# Patient Record
Sex: MALE | Race: OTHER | NOT HISPANIC OR LATINO | URBAN - METROPOLITAN AREA
[De-identification: names, ages, dates, MRNs, and addresses within clinical notes are randomized per-mention and may not be internally consistent; named-entity substitution may affect disease eponyms.]

---

## 2019-10-29 PROBLEM — Z00.00 ENCOUNTER FOR PREVENTIVE HEALTH EXAMINATION: Status: ACTIVE | Noted: 2019-10-29

## 2022-12-17 ENCOUNTER — INPATIENT (INPATIENT)
Facility: HOSPITAL | Age: 52
LOS: 9 days | Discharge: HOME HEALTH | End: 2022-12-27
Attending: STUDENT IN AN ORGANIZED HEALTH CARE EDUCATION/TRAINING PROGRAM | Admitting: GENERAL ACUTE CARE HOSPITAL

## 2022-12-17 VITALS
HEART RATE: 118 BPM | HEIGHT: 68 IN | DIASTOLIC BLOOD PRESSURE: 81 MMHG | SYSTOLIC BLOOD PRESSURE: 117 MMHG | RESPIRATION RATE: 24 BRPM | TEMPERATURE: 99 F | WEIGHT: 250 LBS | OXYGEN SATURATION: 50 %

## 2022-12-17 DIAGNOSIS — U07.1 COVID-19: ICD-10-CM

## 2022-12-17 DIAGNOSIS — E66.2 MORBID (SEVERE) OBESITY WITH ALVEOLAR HYPOVENTILATION: ICD-10-CM

## 2022-12-17 DIAGNOSIS — A41.9 SEPSIS, UNSPECIFIED ORGANISM: ICD-10-CM

## 2022-12-17 LAB
ALBUMIN SERPL ELPH-MCNC: 2.7 G/DL — LOW (ref 3.3–5)
ALP SERPL-CCNC: 73 U/L — SIGNIFICANT CHANGE UP (ref 40–120)
ALT FLD-CCNC: 26 U/L — SIGNIFICANT CHANGE UP (ref 12–78)
ANION GAP SERPL CALC-SCNC: 3 MMOL/L — LOW (ref 5–17)
AST SERPL-CCNC: 11 U/L — LOW (ref 15–37)
BASOPHILS # BLD AUTO: 0.03 K/UL — SIGNIFICANT CHANGE UP (ref 0–0.2)
BASOPHILS NFR BLD AUTO: 0.3 % — SIGNIFICANT CHANGE UP (ref 0–2)
BILIRUB SERPL-MCNC: 0.3 MG/DL — SIGNIFICANT CHANGE UP (ref 0.2–1.2)
BUN SERPL-MCNC: 19 MG/DL — SIGNIFICANT CHANGE UP (ref 7–23)
CALCIUM SERPL-MCNC: 8.2 MG/DL — LOW (ref 8.5–10.1)
CHLORIDE SERPL-SCNC: 99 MMOL/L — SIGNIFICANT CHANGE UP (ref 96–108)
CO2 SERPL-SCNC: 38 MMOL/L — HIGH (ref 22–31)
CREAT SERPL-MCNC: 0.82 MG/DL — SIGNIFICANT CHANGE UP (ref 0.5–1.3)
D DIMER BLD IA.RAPID-MCNC: 287 NG/ML DDU — HIGH
EGFR: 106 ML/MIN/1.73M2 — SIGNIFICANT CHANGE UP
EOSINOPHIL # BLD AUTO: 0.22 K/UL — SIGNIFICANT CHANGE UP (ref 0–0.5)
EOSINOPHIL NFR BLD AUTO: 2 % — SIGNIFICANT CHANGE UP (ref 0–6)
FLUAV AG NPH QL: SIGNIFICANT CHANGE UP
FLUBV AG NPH QL: SIGNIFICANT CHANGE UP
GLUCOSE BLDC GLUCOMTR-MCNC: 170 MG/DL — HIGH (ref 70–99)
GLUCOSE SERPL-MCNC: 142 MG/DL — HIGH (ref 70–99)
HCT VFR BLD CALC: 42.2 % — SIGNIFICANT CHANGE UP (ref 39–50)
HGB BLD-MCNC: 12.9 G/DL — LOW (ref 13–17)
IMM GRANULOCYTES NFR BLD AUTO: 0.6 % — SIGNIFICANT CHANGE UP (ref 0–0.9)
LACTATE SERPL-SCNC: 0.9 MMOL/L — SIGNIFICANT CHANGE UP (ref 0.7–2)
LYMPHOCYTES # BLD AUTO: 1.86 K/UL — SIGNIFICANT CHANGE UP (ref 1–3.3)
LYMPHOCYTES # BLD AUTO: 16.8 % — SIGNIFICANT CHANGE UP (ref 13–44)
MCHC RBC-ENTMCNC: 27.3 PG — SIGNIFICANT CHANGE UP (ref 27–34)
MCHC RBC-ENTMCNC: 30.6 G/DL — LOW (ref 32–36)
MCV RBC AUTO: 89.4 FL — SIGNIFICANT CHANGE UP (ref 80–100)
MONOCYTES # BLD AUTO: 1.21 K/UL — HIGH (ref 0–0.9)
MONOCYTES NFR BLD AUTO: 11 % — SIGNIFICANT CHANGE UP (ref 2–14)
NEUTROPHILS # BLD AUTO: 7.65 K/UL — HIGH (ref 1.8–7.4)
NEUTROPHILS NFR BLD AUTO: 69.3 % — SIGNIFICANT CHANGE UP (ref 43–77)
NRBC # BLD: 0 /100 WBCS — SIGNIFICANT CHANGE UP (ref 0–0)
NT-PROBNP SERPL-SCNC: 443 PG/ML — HIGH (ref 0–125)
PLATELET # BLD AUTO: 185 K/UL — SIGNIFICANT CHANGE UP (ref 150–400)
POTASSIUM SERPL-MCNC: 4.5 MMOL/L — SIGNIFICANT CHANGE UP (ref 3.5–5.3)
POTASSIUM SERPL-SCNC: 4.5 MMOL/L — SIGNIFICANT CHANGE UP (ref 3.5–5.3)
PROT SERPL-MCNC: 7.9 GM/DL — SIGNIFICANT CHANGE UP (ref 6–8.3)
RBC # BLD: 4.72 M/UL — SIGNIFICANT CHANGE UP (ref 4.2–5.8)
RBC # FLD: 13.3 % — SIGNIFICANT CHANGE UP (ref 10.3–14.5)
SARS-COV-2 RNA SPEC QL NAA+PROBE: DETECTED
SODIUM SERPL-SCNC: 140 MMOL/L — SIGNIFICANT CHANGE UP (ref 135–145)
TROPONIN I, HIGH SENSITIVITY RESULT: 54.3 NG/L — SIGNIFICANT CHANGE UP
WBC # BLD: 11.04 K/UL — HIGH (ref 3.8–10.5)
WBC # FLD AUTO: 11.04 K/UL — HIGH (ref 3.8–10.5)

## 2022-12-17 PROCEDURE — 99284 EMERGENCY DEPT VISIT MOD MDM: CPT

## 2022-12-17 PROCEDURE — 93010 ELECTROCARDIOGRAM REPORT: CPT

## 2022-12-17 PROCEDURE — 99233 SBSQ HOSP IP/OBS HIGH 50: CPT

## 2022-12-17 PROCEDURE — 71045 X-RAY EXAM CHEST 1 VIEW: CPT | Mod: 26

## 2022-12-17 RX ORDER — SODIUM CHLORIDE 9 MG/ML
1000 INJECTION, SOLUTION INTRAVENOUS
Refills: 0 | Status: DISCONTINUED | OUTPATIENT
Start: 2022-12-17 | End: 2022-12-27

## 2022-12-17 RX ORDER — DEXTROSE 50 % IN WATER 50 %
25 SYRINGE (ML) INTRAVENOUS ONCE
Refills: 0 | Status: DISCONTINUED | OUTPATIENT
Start: 2022-12-17 | End: 2022-12-27

## 2022-12-17 RX ORDER — DEXAMETHASONE 0.5 MG/5ML
6 ELIXIR ORAL DAILY
Refills: 0 | Status: COMPLETED | OUTPATIENT
Start: 2022-12-18 | End: 2022-12-26

## 2022-12-17 RX ORDER — ENOXAPARIN SODIUM 100 MG/ML
40 INJECTION SUBCUTANEOUS EVERY 12 HOURS
Refills: 0 | Status: DISCONTINUED | OUTPATIENT
Start: 2022-12-17 | End: 2022-12-27

## 2022-12-17 RX ORDER — DEXTROSE 50 % IN WATER 50 %
12.5 SYRINGE (ML) INTRAVENOUS ONCE
Refills: 0 | Status: DISCONTINUED | OUTPATIENT
Start: 2022-12-17 | End: 2022-12-27

## 2022-12-17 RX ORDER — GLUCAGON INJECTION, SOLUTION 0.5 MG/.1ML
1 INJECTION, SOLUTION SUBCUTANEOUS ONCE
Refills: 0 | Status: DISCONTINUED | OUTPATIENT
Start: 2022-12-17 | End: 2022-12-27

## 2022-12-17 RX ORDER — DEXTROSE 50 % IN WATER 50 %
15 SYRINGE (ML) INTRAVENOUS ONCE
Refills: 0 | Status: DISCONTINUED | OUTPATIENT
Start: 2022-12-17 | End: 2022-12-27

## 2022-12-17 RX ORDER — ACETAMINOPHEN 500 MG
650 TABLET ORAL EVERY 6 HOURS
Refills: 0 | Status: DISCONTINUED | OUTPATIENT
Start: 2022-12-17 | End: 2022-12-27

## 2022-12-17 RX ORDER — ALBUTEROL 90 UG/1
2 AEROSOL, METERED ORAL EVERY 6 HOURS
Refills: 0 | Status: DISCONTINUED | OUTPATIENT
Start: 2022-12-17 | End: 2022-12-27

## 2022-12-17 RX ORDER — METFORMIN HYDROCHLORIDE 850 MG/1
1 TABLET ORAL
Qty: 0 | Refills: 0 | DISCHARGE

## 2022-12-17 RX ORDER — LANOLIN ALCOHOL/MO/W.PET/CERES
3 CREAM (GRAM) TOPICAL AT BEDTIME
Refills: 0 | Status: DISCONTINUED | OUTPATIENT
Start: 2022-12-17 | End: 2022-12-27

## 2022-12-17 RX ORDER — INSULIN LISPRO 100/ML
VIAL (ML) SUBCUTANEOUS
Refills: 0 | Status: DISCONTINUED | OUTPATIENT
Start: 2022-12-17 | End: 2022-12-27

## 2022-12-17 RX ORDER — DEXAMETHASONE 0.5 MG/5ML
6 ELIXIR ORAL ONCE
Refills: 0 | Status: COMPLETED | OUTPATIENT
Start: 2022-12-17 | End: 2022-12-17

## 2022-12-17 RX ADMIN — Medication 6 MILLIGRAM(S): at 18:21

## 2022-12-17 RX ADMIN — Medication 1200 MILLIGRAM(S): at 18:04

## 2022-12-17 RX ADMIN — ENOXAPARIN SODIUM 40 MILLIGRAM(S): 100 INJECTION SUBCUTANEOUS at 23:47

## 2022-12-17 NOTE — ED PROVIDER NOTE - CLINICAL SUMMARY MEDICAL DECISION MAKING FREE TEXT BOX
Patient with Covid infection and noted hypoxia at home. Will need inpatient stay due to O2 requirement. Will give Decadron in ED and admit for further care.      Pt labs were ordered and meds as well - pt then got up and spoke to RN - stated he would go to bathroom - thereafter pt could not be located. RN manager and security alerted in case pt still on grounds. Pt was A&Ox3 at time of evaluation but given hypoxia - there is concern for his well being. calls made to patient and wife without any return calls. Pt likely eloped, unsure of reason why as pt encounter had been going smoothly up until time of pt elopement. Patient with Covid infection and noted hypoxia at home. Will need inpatient stay due to O2 requirement. Will give Decadron in ED and admit for further care.

## 2022-12-17 NOTE — ED PROVIDER NOTE - RESPIRATORY, MLM
Breath sounds clear and equal bilaterally.Some slightly diminished breath sounds at bases. No noted edema in the extremities. Excoriations of the bilateral lower extremities likely secondary to dry skin vs eczema.

## 2022-12-17 NOTE — H&P ADULT - NSHPLABSRESULTS_GEN_ALL_CORE
12.9   11.04 )-----------( 185      ( 17 Dec 2022 17:30 )             42.2       12-17    140  |  99  |  19  ----------------------------<  142<H>  4.5   |  38<H>  |  0.82    Ca    8.2<L>      17 Dec 2022 17:30    TPro  7.9  /  Alb  2.7<L>  /  TBili  0.3  /  DBili  x   /  AST  11<L>  /  ALT  26  /  AlkPhos  73  12-17          ABG - ( 18 Dec 2022 05:07 )  pH, Arterial: 7.31  pH, Blood: x     /  pCO2: 81    /  pO2: 129   / HCO3: 41    / Base Excess: 11.0  /  SaO2: 98.7

## 2022-12-17 NOTE — H&P ADULT - HISTORY OF PRESENT ILLNESS
Patient is a native farsi and Japanese speaker. I am fluent in Farsi and examined patient in farsi. Father updated as well. Pt himself somewhat confused, but oriented fully.     51 y/o M with a PMHx of prediabetes presents due to SOB, cough and subjective fever at home. Patient is noted to be Covid + since yesterday. Per ED, patient noted to have O2 saturation of 58% on room air at home. Patient saturations improved on NRB, but he is non-compliant and repeatedly takes off mask out of confusion and agitation despite high respiratory rate. Repeatedly explained importance of oxygen administration for patient's prognosis. Denies any current chest pain, abdominal pain, myalgias, diarrhea, headaches, anosmia.

## 2022-12-17 NOTE — ED PROVIDER NOTE - OBJECTIVE STATEMENT
53 y/o M with a PMHx of DM presents to the ED due to SOB at home. Patient states that he was recently sick with cough and URI. Patient is noted to be Covid + since yesterday. Otherwise, patient noted to have O2 saturation of 58% on room air at home and 93% on nasal cannula at the hospital. Denies any current chest pain, fever, or chills. Patient was seen earlier and eloped form the ED. Patient has pending labs and medication. Patient states that he eloped because he felt like he did not receive proper care here at the ED. Patient reports that his family picked him up and then later returned him back to the ED today.

## 2022-12-17 NOTE — H&P ADULT - ASSESSMENT
#Acute Hypoxic Respiratory Failure #[place] 2/2 COVID-19   - Continuous pulse ox, monitor  - Titrate O2>92%  - Decadron 6mg qDx10 days  - Albuterol, tylenol, tessalon PRN  - F/u inflammatory markers to assess patient prognosis    #Obesity Hypoventilation Syndrome  - Likely contributory to reduced respiratory reserve  - BMI of 38, Bicarb of 38  - Evaluate pt with sleep study for sleep apnea outpatient    #DM  - Anticipate elevated blood glucose with steroid use  - Mod SSI  - Hold home metformin  - F/u POCT, A1c    Code: Full  VTE: Lovenox  Diet: Regular   #Acute Hypoxic Respiratory Failure #Pneumonia 2/2 COVID-19   - Continuous pulse ox, monitor  - Titrate O2>92%  - Decadron 6mg qD x 10 days  - Albuterol, tylenol, tessalon PRN  - F/u inflammatory markers to assess patient prognosis    #Obesity Hypoventilation Syndrome  - Likely contributory to reduced respiratory reserve  - BMI of 38, Bicarb of 38  - Evaluate pt with sleep study for sleep apnea outpatient    #Prediabetes  - Anticipate elevated blood glucose with steroid use  - Mod SSI  - Hold home metformin  - F/u POCT, A1c    Code: Full  VTE: Lovenox  Diet: Regular   #Acute Hypoxic and Hypercapneic Respiratory Failure #Pneumonia 2/2 COVID-19 #Respiratory Acidosis with Metabolic Alkalosis #Obesity Hypoventilation Syndrome  - AB.19, CO2: 114, HCO3: 38  - Bipap initiated, 14/8 @50%  - Continuous pulse ox, monitor  - Titrate O2>92%  - Decadron 6mg qD x 10 days  - Albuterol, tylenol, tessalon PRN  - OHS likely contributory to reduced respiratory reserve  - Patient doesn't smoke, no hx of COPD or asthma  - BMI of 38, Bicarb of 38  - Evaluate pt with sleep study for sleep apnea outpatient  - F/u inflammatory markers to assess patient prognosis    #Prediabetes  - Anticipate elevated blood glucose with steroid use  - Mod SSI  - Hold home metformin  - F/u POCT, A1c    Code: Full  VTE: Lovenox  Diet: Regular   #Acute Hypoxic and Hypercapneic Respiratory Failure #Pneumonia 2/2 COVID-19 #Respiratory Acidosis with Metabolic Alkalosis #Obesity Hypoventilation Syndrome  - AB.19, CO2: 114, HCO3: 38  - Bipap initiated, 16/8 @50%  - F/u repeat blood gas  - Continuous pulse ox, monitor  - Titrate O2>92%  - Decadron 6mg qD x 10 days  - Albuterol, tylenol, tessalon PRN  - OHS likely contributory to reduced respiratory reserve  - Patient doesn't smoke, no hx of COPD or asthma  - BMI of 38, Bicarb of 38  - Evaluate pt with sleep study for sleep apnea outpatient  - F/u inflammatory markers to assess patient prognosis    #Prediabetes  - Anticipate elevated blood glucose with steroid use  - Mod SSI  - Hold home metformin  - F/u POCT, A1c    Code: Full  VTE: Lovenox  Diet: Regular   #Acute Hypoxic and Hypercapneic Respiratory Failure #Pneumonia 2/2 COVID-19 #Respiratory Acidosis with Metabolic Alkalosis #Obesity Hypoventilation Syndrome  - Initial AB.19, CO2: 114, HCO3: 38  - Bipap initiated, 16/8 @50%  - F/u repeat blood gas  - Continuous pulse ox, monitor  - Titrate O2>92%  - Decadron 6mg qD x 10 days  - Albuterol, tylenol, tessalon PRN  - OHS likely contributory to reduced respiratory reserve  - Patient doesn't smoke, no hx of COPD or asthma  - BMI of 38, Bicarb of 38  - Evaluate pt with sleep study for sleep apnea outpatient  - F/u inflammatory markers to assess patient prognosis    #Prediabetes  - Anticipate elevated blood glucose with steroid use  - Mod SSI  - Hold home metformin  - F/u POCT, A1c    Code: Full  VTE: Lovenox  Diet: Regular   #Acute Hypoxic and Hypercapneic Respiratory Failure   #Pneumonia 2/2 COVID-19   #Respiratory Acidosis with Metabolic Alkalosis   #Obesity Hypoventilation Syndrome    - Initial AB.19, CO2: 114, HCO3: 38  - Bipap initiated, 16/8 @50%  - F/u repeat blood gas  - Continuous pulse ox, monitor  - Titrate O2>92%  - Decadron 6mg qD x 10 days  - Albuterol, tylenol, tessalon PRN  - OHS likely contributory to reduced respiratory reserve  - Patient doesn't smoke, no hx of COPD or asthma  - BMI of 38, Bicarb of 38  - Evaluate pt with sleep study for sleep apnea outpatient  - F/u inflammatory markers to assess patient prognosis    #Prediabetes  - Anticipate elevated blood glucose with steroid use  - Mod SSI  - Hold home metformin  - F/u POCT, A1c    Code: Full  VTE: Lovenox  Diet: Regular

## 2022-12-17 NOTE — ED ADULT NURSE NOTE - OBJECTIVE STATEMENT
Pt presents to ed c/o sob, cough, congestion. Pt states +covid since yesterday. +sick contacts at home Pt came to [facility] ed earlier today but eloped to go eat lunch. Now pt back in ed for the 2nd time today. Per ems, spo2 58% on room air at home. Spo2 96% on NRB in ed. Pt appears unwell, diaphoretic. Pt denies cp, dizziness, n/v/d/c, fever/chills, numbness/tingling, abdominal pain, weakness, back pain, neck pain, headache, rash. Sinus tachycardia noted on cardiac montior. 12 lead ekg in progress. +labored breathing.

## 2022-12-17 NOTE — H&P ADULT - NSHPPHYSICALEXAM_GEN_ALL_CORE
T(C): 36.7 (12-18-22 @ 00:00), Max: 37.4 (12-17-22 @ 13:33)  HR: 123 (12-18-22 @ 05:16) (105 - 126)  BP: 128/84 (12-18-22 @ 04:00) (117/81 - 147/98)  RR: 23 (12-18-22 @ 04:00) (20 - 25)  SpO2: 94% (12-18-22 @ 05:16) (50% - 99%)    CONSTITUTIONAL: Well groomed, mild respiratory distress on NRB, diaphoretic  EYES: PERRLA and symmetric, EOMI, No conjunctival or scleral injection, non-icteric  ENMT: Oral mucosa with moist membranes. Normal dentition; no pharyngeal injection or exudates  NECK: Supple, symmetric and without tracheal deviation   RESP: No respiratory distress, no use of accessory muscles; CTA b/l, no WRR  CV: RRR, +S1S2, no MRG; no JVD; no peripheral edema  GI: Soft, NT, ND, no rebound, no guarding  LYMPH: No cervical LAD or tenderness; no axillary LAD or tenderness; no inguinal LAD or tenderness  MSK: Normal ROM without pain, no spinal tenderness, normal muscle strength/tone  SKIN: No rashes or ulcers noted; no subcutaneous nodules or induration palpable  NEURO: CN II-XII intact; sensation intact in upper and lower extremities b/l to light touch   PSYCH: Mood and affect appropriate, recent/remote memory intact

## 2022-12-18 DIAGNOSIS — J96.02 ACUTE RESPIRATORY FAILURE WITH HYPERCAPNIA: ICD-10-CM

## 2022-12-18 DIAGNOSIS — J96.01 ACUTE RESPIRATORY FAILURE WITH HYPOXIA: ICD-10-CM

## 2022-12-18 DIAGNOSIS — E87.4 MIXED DISORDER OF ACID-BASE BALANCE: ICD-10-CM

## 2022-12-18 DIAGNOSIS — J96.22 ACUTE AND CHRONIC RESPIRATORY FAILURE WITH HYPERCAPNIA: ICD-10-CM

## 2022-12-18 LAB
A1C WITH ESTIMATED AVERAGE GLUCOSE RESULT: 7 % — HIGH (ref 4–5.6)
ALBUMIN SERPL ELPH-MCNC: 2.5 G/DL — LOW (ref 3.3–5)
ALP SERPL-CCNC: 74 U/L — SIGNIFICANT CHANGE UP (ref 40–120)
ALT FLD-CCNC: 23 U/L — SIGNIFICANT CHANGE UP (ref 12–78)
ANION GAP SERPL CALC-SCNC: -1 MMOL/L — LOW (ref 5–17)
AST SERPL-CCNC: 11 U/L — LOW (ref 15–37)
BASE EXCESS BLDA CALC-SCNC: 10.5 MMOL/L — HIGH (ref -2–3)
BASE EXCESS BLDA CALC-SCNC: 11 MMOL/L — HIGH (ref -2–3)
BILIRUB SERPL-MCNC: 0.2 MG/DL — SIGNIFICANT CHANGE UP (ref 0.2–1.2)
BLOOD GAS COMMENTS ARTERIAL: SIGNIFICANT CHANGE UP
BLOOD GAS COMMENTS ARTERIAL: SIGNIFICANT CHANGE UP
BUN SERPL-MCNC: 19 MG/DL — SIGNIFICANT CHANGE UP (ref 7–23)
CALCIUM SERPL-MCNC: 8.4 MG/DL — LOW (ref 8.5–10.1)
CHLORIDE SERPL-SCNC: 101 MMOL/L — SIGNIFICANT CHANGE UP (ref 96–108)
CHOLEST SERPL-MCNC: 166 MG/DL — SIGNIFICANT CHANGE UP
CO2 BLDA-SCNC: 43 MMOL/L — HIGH (ref 19–24)
CO2 BLDA-SCNC: 47 MMOL/L — HIGH (ref 19–24)
CO2 SERPL-SCNC: 39 MMOL/L — HIGH (ref 22–31)
CREAT SERPL-MCNC: 0.78 MG/DL — SIGNIFICANT CHANGE UP (ref 0.5–1.3)
CRP SERPL-MCNC: 179 MG/L — HIGH
CRP SERPL-MCNC: 190 MG/L — HIGH
EGFR: 107 ML/MIN/1.73M2 — SIGNIFICANT CHANGE UP
ERYTHROCYTE [SEDIMENTATION RATE] IN BLOOD: 67 MM/HR — HIGH (ref 0–20)
ESTIMATED AVERAGE GLUCOSE: 154 MG/DL — HIGH (ref 68–114)
FERRITIN SERPL-MCNC: 86 NG/ML — SIGNIFICANT CHANGE UP (ref 30–400)
FERRITIN SERPL-MCNC: 92 NG/ML — SIGNIFICANT CHANGE UP (ref 30–400)
GAS PNL BLDA: SIGNIFICANT CHANGE UP
GAS PNL BLDA: SIGNIFICANT CHANGE UP
GLUCOSE BLDC GLUCOMTR-MCNC: 102 MG/DL — HIGH (ref 70–99)
GLUCOSE BLDC GLUCOMTR-MCNC: 120 MG/DL — HIGH (ref 70–99)
GLUCOSE BLDC GLUCOMTR-MCNC: 141 MG/DL — HIGH (ref 70–99)
GLUCOSE SERPL-MCNC: 163 MG/DL — HIGH (ref 70–99)
HCO3 BLDA-SCNC: 41 MMOL/L — HIGH (ref 21–28)
HCO3 BLDA-SCNC: 44 MMOL/L — HIGH (ref 21–28)
HCT VFR BLD CALC: 44.1 % — SIGNIFICANT CHANGE UP (ref 39–50)
HDLC SERPL-MCNC: 42 MG/DL — SIGNIFICANT CHANGE UP
HGB BLD-MCNC: 12.9 G/DL — LOW (ref 13–17)
HOROWITZ INDEX BLDA+IHG-RTO: 100 — SIGNIFICANT CHANGE UP
HOROWITZ INDEX BLDA+IHG-RTO: 60 — SIGNIFICANT CHANGE UP
LIPID PNL WITH DIRECT LDL SERPL: 103 MG/DL — HIGH
MCHC RBC-ENTMCNC: 26.7 PG — LOW (ref 27–34)
MCHC RBC-ENTMCNC: 29.3 G/DL — LOW (ref 32–36)
MCV RBC AUTO: 91.3 FL — SIGNIFICANT CHANGE UP (ref 80–100)
NON HDL CHOLESTEROL: 124 MG/DL — SIGNIFICANT CHANGE UP
NRBC # BLD: 0 /100 WBCS — SIGNIFICANT CHANGE UP (ref 0–0)
PCO2 BLDA: 81 MMHG — CRITICAL HIGH (ref 32–46)
PCO2 BLDA: >112 MMHG — CRITICAL HIGH (ref 32–46)
PH BLDA: 7.19 — CRITICAL LOW (ref 7.35–7.45)
PH BLDA: 7.31 — LOW (ref 7.35–7.45)
PLATELET # BLD AUTO: 186 K/UL — SIGNIFICANT CHANGE UP (ref 150–400)
PO2 BLDA: 129 MMHG — HIGH (ref 83–108)
PO2 BLDA: 155 MMHG — HIGH (ref 83–108)
POTASSIUM SERPL-MCNC: 5.1 MMOL/L — SIGNIFICANT CHANGE UP (ref 3.5–5.3)
POTASSIUM SERPL-SCNC: 5.1 MMOL/L — SIGNIFICANT CHANGE UP (ref 3.5–5.3)
PROCALCITONIN SERPL-MCNC: 0.07 NG/ML — SIGNIFICANT CHANGE UP (ref 0.02–0.1)
PROT SERPL-MCNC: 7.8 GM/DL — SIGNIFICANT CHANGE UP (ref 6–8.3)
RBC # BLD: 4.83 M/UL — SIGNIFICANT CHANGE UP (ref 4.2–5.8)
RBC # FLD: 13.1 % — SIGNIFICANT CHANGE UP (ref 10.3–14.5)
SAO2 % BLDA: 98.3 % — HIGH (ref 94–98)
SAO2 % BLDA: 98.7 % — HIGH (ref 94–98)
SODIUM SERPL-SCNC: 139 MMOL/L — SIGNIFICANT CHANGE UP (ref 135–145)
TRIGL SERPL-MCNC: 107 MG/DL — SIGNIFICANT CHANGE UP
WBC # BLD: 11.35 K/UL — HIGH (ref 3.8–10.5)
WBC # FLD AUTO: 11.35 K/UL — HIGH (ref 3.8–10.5)

## 2022-12-18 PROCEDURE — 99233 SBSQ HOSP IP/OBS HIGH 50: CPT

## 2022-12-18 RX ORDER — REMDESIVIR 5 MG/ML
200 INJECTION INTRAVENOUS EVERY 24 HOURS
Refills: 0 | Status: COMPLETED | OUTPATIENT
Start: 2022-12-18 | End: 2022-12-19

## 2022-12-18 RX ORDER — IPRATROPIUM/ALBUTEROL SULFATE 18-103MCG
3 AEROSOL WITH ADAPTER (GRAM) INHALATION ONCE
Refills: 0 | Status: COMPLETED | OUTPATIENT
Start: 2022-12-18 | End: 2022-12-18

## 2022-12-18 RX ORDER — REMDESIVIR 5 MG/ML
INJECTION INTRAVENOUS
Refills: 0 | Status: COMPLETED | OUTPATIENT
Start: 2022-12-18 | End: 2022-12-23

## 2022-12-18 RX ORDER — HALOPERIDOL DECANOATE 100 MG/ML
2 INJECTION INTRAMUSCULAR ONCE
Refills: 0 | Status: DISCONTINUED | OUTPATIENT
Start: 2022-12-18 | End: 2022-12-18

## 2022-12-18 RX ADMIN — Medication 3 MILLILITER(S): at 01:50

## 2022-12-18 RX ADMIN — Medication 6 MILLIGRAM(S): at 11:40

## 2022-12-18 RX ADMIN — Medication 100 MILLIGRAM(S): at 00:42

## 2022-12-18 RX ADMIN — ENOXAPARIN SODIUM 40 MILLIGRAM(S): 100 INJECTION SUBCUTANEOUS at 23:03

## 2022-12-18 RX ADMIN — ENOXAPARIN SODIUM 40 MILLIGRAM(S): 100 INJECTION SUBCUTANEOUS at 11:39

## 2022-12-18 NOTE — PATIENT PROFILE ADULT - FALL HARM RISK - HARM RISK INTERVENTIONS

## 2022-12-18 NOTE — PROGRESS NOTE ADULT - SUBJECTIVE AND OBJECTIVE BOX
Patient is a 52y old  Male who presents with a chief complaint of COVID-19 pneumonia (17 Dec 2022 21:30)    INTERVAL HPI/OVERNIGHT EVENTS: Patients seen and examined at bedside this morning. No acute events overnight. Pt lethargic.     MEDICATIONS  (STANDING):  dexAMETHasone     Tablet 6 milliGRAM(s) Oral daily  dextrose 5%. 1000 milliLiter(s) (100 mL/Hr) IV Continuous <Continuous>  dextrose 5%. 1000 milliLiter(s) (50 mL/Hr) IV Continuous <Continuous>  dextrose 50% Injectable 25 Gram(s) IV Push once  dextrose 50% Injectable 12.5 Gram(s) IV Push once  dextrose 50% Injectable 25 Gram(s) IV Push once  enoxaparin Injectable 40 milliGRAM(s) SubCutaneous every 12 hours  glucagon  Injectable 1 milliGRAM(s) IntraMuscular once  insulin lispro (ADMELOG) corrective regimen sliding scale   SubCutaneous three times a day before meals    MEDICATIONS  (PRN):  acetaminophen     Tablet .. 650 milliGRAM(s) Oral every 6 hours PRN Temp greater or equal to 38C (100.4F), Mild Pain (1 - 3)  albuterol    90 MICROgram(s) HFA Inhaler 2 Puff(s) Inhalation every 6 hours PRN Shortness of Breath  benzonatate 100 milliGRAM(s) Oral every 8 hours PRN Cough  dextrose Oral Gel 15 Gram(s) Oral once PRN Blood Glucose LESS THAN 70 milliGRAM(s)/deciliter  melatonin 3 milliGRAM(s) Oral at bedtime PRN Insomnia    Allergies    penicillin (Unknown)    Intolerances      REVIEW OF SYSTEMS:  All other systems reviewed and are negative    Vital Signs Last 24 Hrs  T(C): 36.7 (18 Dec 2022 07:35), Max: 37.4 (17 Dec 2022 13:33)  T(F): 98 (18 Dec 2022 07:35), Max: 99.4 (17 Dec 2022 13:33)  HR: 102 (18 Dec 2022 08:55) (102 - 126)  BP: 115/73 (18 Dec 2022 08:22) (115/73 - 147/98)  BP(mean): 87 (18 Dec 2022 07:35) (87 - 87)  RR: 18 (18 Dec 2022 08:22) (18 - 25)  SpO2: 92% (18 Dec 2022 08:55) (50% - 99%)    Parameters below as of 18 Dec 2022 08:22  Patient On (Oxygen Delivery Method): BiPAP/CPAP      Daily Height in cm: 172.72 (17 Dec 2022 16:41)    Daily   I&O's Summary    17 Dec 2022 07:01  -  18 Dec 2022 07:00  --------------------------------------------------------  IN: 150 mL / OUT: 620 mL / NET: -470 mL      CAPILLARY BLOOD GLUCOSE      POCT Blood Glucose.: 102 mg/dL (18 Dec 2022 11:18)  POCT Blood Glucose.: 170 mg/dL (17 Dec 2022 23:09)    PHYSICAL EXAM:  CONSTITUTIONAL: Well groomed, mild respiratory distress on NRB, diaphoretic  EYES: PERRLA and symmetric, EOMI, No conjunctival or scleral injection, non-icteric  ENMT: Oral mucosa with moist membranes. Normal dentition; no pharyngeal injection or exudates  NECK: Supple, symmetric and without tracheal deviation   RESP: No respiratory distress, no use of accessory muscles; CTA b/l, diffuse ronchi  CV: RRR, +S1S2, no MRG; no JVD; no peripheral edema  GI: Soft, NT, ND, no rebound, no guarding  LYMPH: No cervical LAD or tenderness; no axillary LAD or tenderness; no inguinal LAD or tenderness  MSK: Normal ROM without pain, no spinal tenderness, normal muscle strength/tone  SKIN: No rashes or ulcers noted; no subcutaneous nodules or induration palpable  NEURO: CN II-XII intact; sensation intact in upper and lower extremities b/l to light touch   PSYCH: Mood and affect appropriate, recent/remote memory intact      Labs                          12.9   11.35 )-----------( 186      ( 18 Dec 2022 05:30 )             44.1     12-18    139  |  101  |  19  ----------------------------<  163<H>  5.1   |  39<H>  |  0.78    Ca    8.4<L>      18 Dec 2022 05:30    TPro  7.8  /  Alb  2.5<L>  /  TBili  0.2  /  DBili  x   /  AST  11<L>  /  ALT  23  /  AlkPhos  74  12-18                            Radiology and Imaging reviewed.

## 2022-12-18 NOTE — PROGRESS NOTE ADULT - ASSESSMENT
#Acute Hypoxic and Hypercapneic Respiratory Failure   #Pneumonia 2/2 COVID-19   #Respiratory Acidosis with Metabolic Alkalosis   #Obesity Hypoventilation Syndrome    - Initial AB.19, CO2: 114, HCO3: 38  - Bipap initiated,  @50%  - F/u repeat blood gas  - Continuous pulse ox, monitor  - Titrate O2>92%  - Decadron 6mg qD x 10 days  - Albuterol, tylenol, tessalon PRN  - OHS likely contributory to reduced respiratory reserve  - Patient doesn't smoke, no hx of COPD or asthma  - BMI of 38, Bicarb of 38  - Evaluate pt with sleep study for sleep apnea outpatient  - F/u inflammatory markers to assess patient prognosis  - () Start remdesivir. C/W bipap     #Prediabetes  - Anticipate elevated blood glucose with steroid use  - Mod SSI  - Hold home metformin  - F/u POCT, A1c    Code: Full  VTE: Lovenox  Diet: Regular    Problem/Plan - 1:  ·  Problem: Sepsis with acute hypoxic respiratory failure.     Problem/Plan - 2:  ·  Problem: Pneumonia due to COVID-19 virus.     Problem/Plan - 3:  ·  Problem: Obesity hypoventilation syndrome.     Problem/Plan - 4:  ·  Problem: Acute respiratory failure with hypoxia and hypercapnia.     Problem/Plan - 5:  ·  Problem: Acute on chronic respiratory acidosis.

## 2022-12-19 LAB
ALBUMIN SERPL ELPH-MCNC: 2.3 G/DL — LOW (ref 3.3–5)
ALBUMIN SERPL ELPH-MCNC: 2.3 G/DL — LOW (ref 3.3–5)
ALP SERPL-CCNC: 63 U/L — SIGNIFICANT CHANGE UP (ref 40–120)
ALP SERPL-CCNC: 65 U/L — SIGNIFICANT CHANGE UP (ref 40–120)
ALT FLD-CCNC: 17 U/L — SIGNIFICANT CHANGE UP (ref 12–78)
ALT FLD-CCNC: 18 U/L — SIGNIFICANT CHANGE UP (ref 12–78)
ANION GAP SERPL CALC-SCNC: 1 MMOL/L — LOW (ref 5–17)
AST SERPL-CCNC: 5 U/L — LOW (ref 15–37)
AST SERPL-CCNC: 5 U/L — LOW (ref 15–37)
BILIRUB DIRECT SERPL-MCNC: <0.05 — SIGNIFICANT CHANGE UP (ref 0–0.3)
BILIRUB INDIRECT FLD-MCNC: SIGNIFICANT CHANGE UP (ref 0.2–1)
BILIRUB SERPL-MCNC: 0.2 MG/DL — SIGNIFICANT CHANGE UP (ref 0.2–1.2)
BILIRUB SERPL-MCNC: 0.2 MG/DL — SIGNIFICANT CHANGE UP (ref 0.2–1.2)
BUN SERPL-MCNC: 23 MG/DL — SIGNIFICANT CHANGE UP (ref 7–23)
CALCIUM SERPL-MCNC: 8.4 MG/DL — LOW (ref 8.5–10.1)
CHLORIDE SERPL-SCNC: 99 MMOL/L — SIGNIFICANT CHANGE UP (ref 96–108)
CO2 SERPL-SCNC: 41 MMOL/L — HIGH (ref 22–31)
CREAT SERPL-MCNC: 0.6 MG/DL — SIGNIFICANT CHANGE UP (ref 0.5–1.3)
CREAT SERPL-MCNC: 0.64 MG/DL — SIGNIFICANT CHANGE UP (ref 0.5–1.3)
EGFR: 114 ML/MIN/1.73M2 — SIGNIFICANT CHANGE UP
EGFR: 116 ML/MIN/1.73M2 — SIGNIFICANT CHANGE UP
GLUCOSE BLDC GLUCOMTR-MCNC: 109 MG/DL — HIGH (ref 70–99)
GLUCOSE BLDC GLUCOMTR-MCNC: 110 MG/DL — HIGH (ref 70–99)
GLUCOSE BLDC GLUCOMTR-MCNC: 120 MG/DL — HIGH (ref 70–99)
GLUCOSE BLDC GLUCOMTR-MCNC: 191 MG/DL — HIGH (ref 70–99)
GLUCOSE SERPL-MCNC: 132 MG/DL — HIGH (ref 70–99)
HCT VFR BLD CALC: 41.2 % — SIGNIFICANT CHANGE UP (ref 39–50)
HGB BLD-MCNC: 12.1 G/DL — LOW (ref 13–17)
MAGNESIUM SERPL-MCNC: 1.9 MG/DL — SIGNIFICANT CHANGE UP (ref 1.6–2.6)
MCHC RBC-ENTMCNC: 27.8 PG — SIGNIFICANT CHANGE UP (ref 27–34)
MCHC RBC-ENTMCNC: 29.4 G/DL — LOW (ref 32–36)
MCV RBC AUTO: 94.5 FL — SIGNIFICANT CHANGE UP (ref 80–100)
NRBC # BLD: 0 /100 WBCS — SIGNIFICANT CHANGE UP (ref 0–0)
PHOSPHATE SERPL-MCNC: 2.2 MG/DL — LOW (ref 2.5–4.5)
PLATELET # BLD AUTO: 195 K/UL — SIGNIFICANT CHANGE UP (ref 150–400)
POTASSIUM SERPL-MCNC: 4.7 MMOL/L — SIGNIFICANT CHANGE UP (ref 3.5–5.3)
POTASSIUM SERPL-SCNC: 4.7 MMOL/L — SIGNIFICANT CHANGE UP (ref 3.5–5.3)
PROT SERPL-MCNC: 7.2 GM/DL — SIGNIFICANT CHANGE UP (ref 6–8.3)
PROT SERPL-MCNC: 7.2 GM/DL — SIGNIFICANT CHANGE UP (ref 6–8.3)
RBC # BLD: 4.36 M/UL — SIGNIFICANT CHANGE UP (ref 4.2–5.8)
RBC # FLD: 13 % — SIGNIFICANT CHANGE UP (ref 10.3–14.5)
SODIUM SERPL-SCNC: 141 MMOL/L — SIGNIFICANT CHANGE UP (ref 135–145)
WBC # BLD: 10.3 K/UL — SIGNIFICANT CHANGE UP (ref 3.8–10.5)
WBC # FLD AUTO: 10.3 K/UL — SIGNIFICANT CHANGE UP (ref 3.8–10.5)

## 2022-12-19 PROCEDURE — 99233 SBSQ HOSP IP/OBS HIGH 50: CPT

## 2022-12-19 PROCEDURE — 99223 1ST HOSP IP/OBS HIGH 75: CPT

## 2022-12-19 RX ORDER — REMDESIVIR 5 MG/ML
100 INJECTION INTRAVENOUS EVERY 24 HOURS
Refills: 0 | Status: COMPLETED | OUTPATIENT
Start: 2022-12-20 | End: 2022-12-23

## 2022-12-19 RX ORDER — SODIUM,POTASSIUM PHOSPHATES 278-250MG
1 POWDER IN PACKET (EA) ORAL THREE TIMES A DAY
Refills: 0 | Status: COMPLETED | OUTPATIENT
Start: 2022-12-19 | End: 2022-12-19

## 2022-12-19 RX ADMIN — ENOXAPARIN SODIUM 40 MILLIGRAM(S): 100 INJECTION SUBCUTANEOUS at 12:05

## 2022-12-19 RX ADMIN — Medication 6 MILLIGRAM(S): at 06:41

## 2022-12-19 RX ADMIN — REMDESIVIR 500 MILLIGRAM(S): 5 INJECTION INTRAVENOUS at 01:17

## 2022-12-19 RX ADMIN — Medication 2: at 12:05

## 2022-12-19 RX ADMIN — Medication 1 PACKET(S): at 14:22

## 2022-12-19 RX ADMIN — Medication 1 PACKET(S): at 22:21

## 2022-12-19 RX ADMIN — Medication 1 PACKET(S): at 12:05

## 2022-12-19 RX ADMIN — ENOXAPARIN SODIUM 40 MILLIGRAM(S): 100 INJECTION SUBCUTANEOUS at 22:21

## 2022-12-19 NOTE — CONSULT NOTE ADULT - TIME BILLING
direct patient care, review of records, medication, blood work, imaging, vitals, discussion with patient

## 2022-12-19 NOTE — CONSULT NOTE ADULT - SUBJECTIVE AND OBJECTIVE BOX
Patient is a 52y old  Male who presents with a chief complaint of COVID-19 pneumonia (26 Dec 2022 20:20)  *hx obtained from chart, pt currently on bipap and difficult to obtain history    HPI:  Patient is a native farsi and Georgian speaker. I am fluent in Farsi and examined patient in farsi. Father updated as well. Pt himself somewhat confused, but oriented fully.     51 y/o M with a PMHx of prediabetes presents due to SOB, cough and subjective fever at home. Patient is noted to be Covid + since yesterday. Per ED, patient noted to have O2 saturation of 58% on room air at home. Patient saturations improved on NRB, but he is non-compliant and repeatedly takes off mask out of confusion and agitation despite high respiratory rate. Repeatedly explained importance of oxygen administration for patient's prognosis. Denies any current chest pain, abdominal pain, myalgias, diarrhea, headaches, anosmia.  (17 Dec 2022 21:30)    Informed by primary team that pt also with hx of asthma per pt's father.      Allergies  penicillin (Unknown)          MEDICATIONS  (STANDING):  dexAMETHasone     Tablet 6 milliGRAM(s) Oral daily  dextrose 5%. 1000 milliLiter(s) (100 mL/Hr) IV Continuous <Continuous>  dextrose 5%. 1000 milliLiter(s) (50 mL/Hr) IV Continuous <Continuous>  dextrose 50% Injectable 25 Gram(s) IV Push once  dextrose 50% Injectable 12.5 Gram(s) IV Push once  dextrose 50% Injectable 25 Gram(s) IV Push once  enoxaparin Injectable 40 milliGRAM(s) SubCutaneous every 12 hours  glucagon  Injectable 1 milliGRAM(s) IntraMuscular once  insulin lispro (ADMELOG) corrective regimen sliding scale   SubCutaneous three times a day before meals  potassium phosphate / sodium phosphate Powder (PHOS-NaK) 1 Packet(s) Oral three times a day  remdesivir  IVPB   IV Intermittent     MEDICATIONS  (PRN):  acetaminophen     Tablet .. 650 milliGRAM(s) Oral every 6 hours PRN Temp greater or equal to 38C (100.4F), Mild Pain (1 - 3)  albuterol    90 MICROgram(s) HFA Inhaler 2 Puff(s) Inhalation every 6 hours PRN Shortness of Breath          Daily       Drug Dosing Weight  Height (cm): 172.7 (17 Dec 2022 16:41)  Weight (kg): 113.4 (17 Dec 2022 16:41)  BMI (kg/m2): 38 (17 Dec 2022 16:41)  BSA (m2): 2.25 (17 Dec 2022 16:41)    PAST MEDICAL & SURGICAL HISTORY:  Prediabetes    Asthma      FAMILY HISTORY:  unable to obtain due to bipap use    SOCIAL HISTORY:  denies smoking, alcohol, drug use      REVIEW OF SYSTEMS:  limited due to bipap use  no fever, no chills  no HA  no SOB, no cough  no CP  no abdominal pain          Vital Signs Last 24 Hrs  T(C): 36.9 (19 Dec 2022 04:26), Max: 36.9 (19 Dec 2022 04:26)  T(F): 98.5 (19 Dec 2022 04:26), Max: 98.5 (19 Dec 2022 04:26)  HR: 110 (19 Dec 2022 05:21) (102 - 114)  BP: 107/70 (19 Dec 2022 04:26) (107/70 - 131/84)  RR: 18 (19 Dec 2022 04:26) (18 - 18)  SpO2: 94% (19 Dec 2022 05:21) (87% - 96%)    Parameters below as of 19 Dec 2022 04:26  Patient On (Oxygen Delivery Method): BiPAP/CPAP        PHYSICAL EXAM:  GENERAL: obese male, lying in lateral recumbent position, NAD, with bipap mask on  HEAD:  Atraumatic, Normocephalic  EYES: EOMI, conjunctiva and sclera clear  ENMT: Bipap mask over face  NECK: Supple, No JVD  NERVOUS SYSTEM:  follows commands, moving all extremities  CHEST/LUNG: Clear to auscultation bilaterally; No rales, rhonchi, wheezing  HEART: Regular rate and rhythm  ABDOMEN: Soft, Nontender, Nondistended; Bowel sounds present  EXTREMITIES:  2+ Peripheral Pulses, No clubbing, cyanosis, or edema  SKIN: bilateral LE with patches of dry/scaly skin    LABS:    Complete Blood Count in AM (12.19.22 @ 05:35)    Nucleated RBC: 0 /100 WBCs    WBC Count: 10.30 K/uL    RBC Count: 4.36 M/uL    Hemoglobin: 12.1 g/dL    Hematocrit: 41.2 %    Mean Cell Volume: 94.5 fl    Mean Cell Hemoglobin: 27.8 pg    Mean Cell Hemoglobin Conc: 29.4 g/dL    Red Cell Distrib Width: 13.0 %    Platelet Count - Automated: 195 K/uL    Comprehensive Metabolic Panel in AM (12.19.22 @ 05:35)    Sodium, Serum: 141 mmol/L    Potassium, Serum: 4.7 mmol/L    Chloride, Serum: 99 mmol/L    Carbon Dioxide, Serum: 41 mmol/L    Anion Gap, Serum: 1 mmol/L    Blood Urea Nitrogen, Serum: 23 mg/dL    Creatinine, Serum: 0.64 mg/dL    Glucose, Serum: 132 mg/dL    Calcium, Total Serum: 8.4 mg/dL    Protein Total, Serum: 7.2 gm/dL    Albumin, Serum: 2.3 g/dL    Bilirubin Total, Serum: 0.2 mg/dL    Alkaline Phosphatase, Serum: 63 U/L    Aspartate Aminotransferase (AST/SGOT): 5 U/L    Alanine Aminotransferase (ALT/SGPT): 17 U/L    Blood Gas Profile - Arterial (12.18.22 @ 05:07)    pH, Arterial: 7.31    pCO2, Arterial: 81 mmHg    pO2, Arterial: 129 mmHg    HCO3, Arterial: 41 mmol/L    Base Excess, Arterial: 11.0 mmol/L    Oxygen Saturation, Arterial: 98.7 %    Total CO2, Arterial: 43 mmol/L    FIO2, Arterial: 60    Blood Gas Comments Arterial: bipap 16/8/60% abg drawn left radial.tello test positive.held site no  hematoma no bleeding noted    Blood Gas Source Arterial: Arterial      Blood Gas Profile - Arterial (12.18.22 @ 01:42)    pH, Arterial: 7.19    pCO2, Arterial: >112 mmHg    pO2, Arterial: 155 mmHg    HCO3, Arterial: 44 mmol/L    Base Excess, Arterial: 10.5 mmol/L    Oxygen Saturation, Arterial: 98.3 %    Total CO2, Arterial: 47 mmol/L    FIO2, Arterial: 100.0    Blood Gas Comments Arterial: 100%NRB mask abg drawn right radial .tello test positive.held site no  hematoma/bleeding noted    Blood Gas Source Arterial: Arterial          RADIOLOGY:  CXR < from: Xray Chest 1 View- PORTABLE-Urgent (12.17.22 @ 17:57) >  diffuse bilateral airspace infiltrates.    Possible small pleural effusions.

## 2022-12-19 NOTE — PROGRESS NOTE ADULT - ASSESSMENT
#Acute Hypoxic and Hypercapneic Respiratory Failure   #Pneumonia 2/2 COVID-19   #Respiratory Acidosis with Metabolic Alkalosis   #Obesity Hypoventilation Syndrome    - Initial AB.19, CO2: 114, HCO3: 38  - Bipap initiated,  @50%  - F/u repeat blood gas  - Continuous pulse ox, monitor  - Titrate O2>92%  - Decadron 6mg qD x 10 days  - Albuterol, tylenol, tessalon PRN  - OHS likely contributory to reduced respiratory reserve  - Patient doesn't smoke, no hx of COPD or asthma  - BMI of 38, Bicarb of 38  - Evaluate pt with sleep study for sleep apnea outpatient  - F/u inflammatory markers to assess patient prognosis  - () Start remdesivir. C/W bipap     #Prediabetes  - Anticipate elevated blood glucose with steroid use  - Mod SSI  - Hold home metformin  - F/u POCT, A1c    Code: Full  VTE: Lovenox  Diet: Regular    Problem/Plan - 1:  ·  Problem: Sepsis with acute hypoxic respiratory failure.     Problem/Plan - 2:  ·  Problem: Pneumonia due to COVID-19 virus.     Problem/Plan - 3:  ·  Problem: Obesity hypoventilation syndrome.     Problem/Plan - 4:  ·  Problem: Acute respiratory failure with hypoxia and hypercapnia.     Problem/Plan - 5:  ·  Problem: Acute on chronic respiratory acidosis.      #Acute Hypoxic and Hypercapneic Respiratory Failure   #Pneumonia 2/2 COVID-19   #Respiratory Acidosis with Metabolic Alkalosis   #Obesity Hypoventilation Syndrome    - Initial AB.19, CO2: 114, HCO3: 38  - () Bipap initiated, 16/8 @50% transitioned to 6 L NC.  - Continuous pulse ox, monitor  - Titrate O2>92%  - Decadron 6mg qD x 10 days  - Albuterol, tylenol, tessalon PRN  - BMI of 38, Bicarb of 38  - Evaluate pt with sleep study for sleep apnea outpatient  - () Start remdesivir. C/W bipap. Pulm consult     #Prediabetes  - Anticipate elevated blood glucose with steroid use  - Mod ISS  - Hga1c: 7.0    Code: Full  VTE: Lovenox  Diet: Regular    Problem/Plan - 1:  ·  Problem: Sepsis with acute hypoxic respiratory failure.     Problem/Plan - 2:  ·  Problem: Pneumonia due to COVID-19 virus.     Problem/Plan - 3:  ·  Problem: Obesity hypoventilation syndrome.     Problem/Plan - 4:  ·  Problem: Acute respiratory failure with hypoxia and hypercapnia.     Problem/Plan - 5:  ·  Problem: Acute on chronic respiratory acidosis.

## 2022-12-19 NOTE — CONSULT NOTE ADULT - ASSESSMENT
52M PMH obesity, asthma, ?PRINCE, preDM presents for SOB, cough, fever. Reports  + COVID test day prior to admission. Found to be hypoxic by EMS to the 50s on RA. ABG 7.19/>112/155/44/98% on 4LNC. Placed on bipap. Admitted to medical floor for COVID    DX: acute on chronic hypercarbic respiratory failure, acute hypoxic respiratory failure, COVID PNA, acute metabolic encephalopathy, obesity, chronic metabolic alkalosis      - pt with respiratory acidosis and metabolic alkalosis  - he has chronic hypercarbia CO2 retention likely due to underlying PRINCE  - bicarb likely elevated at baseline due to compensation of chronic hypercarbia  - blood gas improving with BiPAP  - pt at times self removing bipap mask, educated and reinforced importance of compliance to treatment  - cont with BiPAP, wean off to nasal cannula as tolerates  - maintain O2 sat > 90%  - cont remdesivir/dexamethasone for treatement of COVID  - DVT ppx

## 2022-12-19 NOTE — PROGRESS NOTE ADULT - SUBJECTIVE AND OBJECTIVE BOX
Patient is a 52y old  Male who presents with a chief complaint of COVID-19 pneumonia (18 Dec 2022 11:40)    INTERVAL HPI/OVERNIGHT EVENTS: Patients seen and examined at bedside this morning. No acute events overnight. Pt reports    MEDICATIONS  (STANDING):  dexAMETHasone     Tablet 6 milliGRAM(s) Oral daily  dextrose 5%. 1000 milliLiter(s) (100 mL/Hr) IV Continuous <Continuous>  dextrose 5%. 1000 milliLiter(s) (50 mL/Hr) IV Continuous <Continuous>  dextrose 50% Injectable 25 Gram(s) IV Push once  dextrose 50% Injectable 12.5 Gram(s) IV Push once  dextrose 50% Injectable 25 Gram(s) IV Push once  enoxaparin Injectable 40 milliGRAM(s) SubCutaneous every 12 hours  glucagon  Injectable 1 milliGRAM(s) IntraMuscular once  insulin lispro (ADMELOG) corrective regimen sliding scale   SubCutaneous three times a day before meals  potassium phosphate / sodium phosphate Powder (PHOS-NaK) 1 Packet(s) Oral three times a day  remdesivir  IVPB   IV Intermittent     MEDICATIONS  (PRN):  acetaminophen     Tablet .. 650 milliGRAM(s) Oral every 6 hours PRN Temp greater or equal to 38C (100.4F), Mild Pain (1 - 3)  albuterol    90 MICROgram(s) HFA Inhaler 2 Puff(s) Inhalation every 6 hours PRN Shortness of Breath  benzonatate 100 milliGRAM(s) Oral every 8 hours PRN Cough  dextrose Oral Gel 15 Gram(s) Oral once PRN Blood Glucose LESS THAN 70 milliGRAM(s)/deciliter  melatonin 3 milliGRAM(s) Oral at bedtime PRN Insomnia    Allergies    penicillin (Unknown)    Intolerances      REVIEW OF SYSTEMS:  All other systems reviewed and are negative    Vital Signs Last 24 Hrs  T(C): 36.9 (19 Dec 2022 04:26), Max: 36.9 (19 Dec 2022 04:26)  T(F): 98.5 (19 Dec 2022 04:26), Max: 98.5 (19 Dec 2022 04:26)  HR: 110 (19 Dec 2022 05:21) (102 - 114)  BP: 107/70 (19 Dec 2022 04:26) (107/70 - 131/84)  BP(mean): --  RR: 18 (19 Dec 2022 04:26) (18 - 18)  SpO2: 94% (19 Dec 2022 05:21) (87% - 96%)    Parameters below as of 19 Dec 2022 04:26  Patient On (Oxygen Delivery Method): BiPAP/CPAP      Daily     Daily   I&O's Summary    18 Dec 2022 07:01  -  19 Dec 2022 07:00  --------------------------------------------------------  IN: 480 mL / OUT: 0 mL / NET: 480 mL      CAPILLARY BLOOD GLUCOSE      POCT Blood Glucose.: 110 mg/dL (19 Dec 2022 08:07)  POCT Blood Glucose.: 120 mg/dL (18 Dec 2022 21:53)  POCT Blood Glucose.: 141 mg/dL (18 Dec 2022 16:41)  POCT Blood Glucose.: 102 mg/dL (18 Dec 2022 11:18)    PHYSICAL EXAM:  CONSTITUTIONAL: Well groomed, mild respiratory distress on NRB, diaphoretic  EYES: PERRLA and symmetric, EOMI, No conjunctival or scleral injection, non-icteric  ENMT: Oral mucosa with moist membranes. Normal dentition; no pharyngeal injection or exudates  NECK: Supple, symmetric and without tracheal deviation   RESP: No respiratory distress, no use of accessory muscles; CTA b/l, diffuse ronchi  CV: RRR, +S1S2, no MRG; no JVD; no peripheral edema  GI: Soft, NT, ND, no rebound, no guarding  LYMPH: No cervical LAD or tenderness; no axillary LAD or tenderness; no inguinal LAD or tenderness  MSK: Normal ROM without pain, no spinal tenderness, normal muscle strength/tone  SKIN: No rashes or ulcers noted; no subcutaneous nodules or induration palpable  NEURO: CN II-XII intact; sensation intact in upper and lower extremities b/l to light touch   PSYCH: Mood and affect appropriate, recent/remote memory intact      Labs                          12.1   10.30 )-----------( 195      ( 19 Dec 2022 05:35 )             41.2     12-19    141  |  99  |  23  ----------------------------<  132<H>  4.7   |  41<H>  |  0.64    Ca    8.4<L>      19 Dec 2022 05:35  Phos  2.2     12-19  Mg     1.9     12-19    TPro  7.2  /  Alb  2.3<L>  /  TBili  0.2  /  DBili  <0.05  /  AST  5<L>  /  ALT  17  /  AlkPhos  63  12-19                            Radiology and Imaging reviewed. Patient is a 52y old  Male who presents with a chief complaint of COVID-19 pneumonia (18 Dec 2022 11:40)    INTERVAL HPI/OVERNIGHT EVENTS: Patients seen and examined at bedside this morning. No acute events overnight. Pt take off oxygen on and off. Wore bipap overnight.     MEDICATIONS  (STANDING):  dexAMETHasone     Tablet 6 milliGRAM(s) Oral daily  dextrose 5%. 1000 milliLiter(s) (100 mL/Hr) IV Continuous <Continuous>  dextrose 5%. 1000 milliLiter(s) (50 mL/Hr) IV Continuous <Continuous>  dextrose 50% Injectable 25 Gram(s) IV Push once  dextrose 50% Injectable 12.5 Gram(s) IV Push once  dextrose 50% Injectable 25 Gram(s) IV Push once  enoxaparin Injectable 40 milliGRAM(s) SubCutaneous every 12 hours  glucagon  Injectable 1 milliGRAM(s) IntraMuscular once  insulin lispro (ADMELOG) corrective regimen sliding scale   SubCutaneous three times a day before meals  potassium phosphate / sodium phosphate Powder (PHOS-NaK) 1 Packet(s) Oral three times a day  remdesivir  IVPB   IV Intermittent     MEDICATIONS  (PRN):  acetaminophen     Tablet .. 650 milliGRAM(s) Oral every 6 hours PRN Temp greater or equal to 38C (100.4F), Mild Pain (1 - 3)  albuterol    90 MICROgram(s) HFA Inhaler 2 Puff(s) Inhalation every 6 hours PRN Shortness of Breath  benzonatate 100 milliGRAM(s) Oral every 8 hours PRN Cough  dextrose Oral Gel 15 Gram(s) Oral once PRN Blood Glucose LESS THAN 70 milliGRAM(s)/deciliter  melatonin 3 milliGRAM(s) Oral at bedtime PRN Insomnia    Allergies    penicillin (Unknown)    Intolerances      REVIEW OF SYSTEMS:  All other systems reviewed and are negative    Vital Signs Last 24 Hrs  T(C): 36.9 (19 Dec 2022 04:26), Max: 36.9 (19 Dec 2022 04:26)  T(F): 98.5 (19 Dec 2022 04:26), Max: 98.5 (19 Dec 2022 04:26)  HR: 110 (19 Dec 2022 05:21) (102 - 114)  BP: 107/70 (19 Dec 2022 04:26) (107/70 - 131/84)  BP(mean): --  RR: 18 (19 Dec 2022 04:26) (18 - 18)  SpO2: 94% (19 Dec 2022 05:21) (87% - 96%)    Parameters below as of 19 Dec 2022 04:26  Patient On (Oxygen Delivery Method): BiPAP/CPAP      Daily     Daily   I&O's Summary    18 Dec 2022 07:01  -  19 Dec 2022 07:00  --------------------------------------------------------  IN: 480 mL / OUT: 0 mL / NET: 480 mL      CAPILLARY BLOOD GLUCOSE      POCT Blood Glucose.: 110 mg/dL (19 Dec 2022 08:07)  POCT Blood Glucose.: 120 mg/dL (18 Dec 2022 21:53)  POCT Blood Glucose.: 141 mg/dL (18 Dec 2022 16:41)  POCT Blood Glucose.: 102 mg/dL (18 Dec 2022 11:18)    PHYSICAL EXAM:  CONSTITUTIONAL: Well groomed, mild respiratory distress on NRB, diaphoretic  EYES: PERRLA and symmetric, EOMI, No conjunctival or scleral injection, non-icteric  ENMT: Oral mucosa with moist membranes. Normal dentition; no pharyngeal injection or exudates  NECK: Supple, symmetric and without tracheal deviation   RESP: No respiratory distress, no use of accessory muscles; CTA b/l, diffuse ronchi  CV: RRR, +S1S2, no MRG; no JVD; no peripheral edema  GI: Soft, NT, ND, no rebound, no guarding  LYMPH: No cervical LAD or tenderness; no axillary LAD or tenderness; no inguinal LAD or tenderness  MSK: Normal ROM without pain, no spinal tenderness, normal muscle strength/tone  SKIN: No rashes or ulcers noted; no subcutaneous nodules or induration palpable  NEURO: CN II-XII intact; sensation intact in upper and lower extremities b/l to light touch   PSYCH: Mood and affect appropriate, recent/remote memory intact      Labs                          12.1   10.30 )-----------( 195      ( 19 Dec 2022 05:35 )             41.2     12-19    141  |  99  |  23  ----------------------------<  132<H>  4.7   |  41<H>  |  0.64    Ca    8.4<L>      19 Dec 2022 05:35  Phos  2.2     12-19  Mg     1.9     12-19    TPro  7.2  /  Alb  2.3<L>  /  TBili  0.2  /  DBili  <0.05  /  AST  5<L>  /  ALT  17  /  AlkPhos  63  12-19                            Radiology and Imaging reviewed.

## 2022-12-20 LAB
ALBUMIN SERPL ELPH-MCNC: 2.2 G/DL — LOW (ref 3.3–5)
ALBUMIN SERPL ELPH-MCNC: 2.3 G/DL — LOW (ref 3.3–5)
ALP SERPL-CCNC: 59 U/L — SIGNIFICANT CHANGE UP (ref 40–120)
ALP SERPL-CCNC: 59 U/L — SIGNIFICANT CHANGE UP (ref 40–120)
ALT FLD-CCNC: 20 U/L — SIGNIFICANT CHANGE UP (ref 12–78)
ALT FLD-CCNC: 21 U/L — SIGNIFICANT CHANGE UP (ref 12–78)
ANION GAP SERPL CALC-SCNC: <-1 MMOL/L — LOW (ref 5–17)
AST SERPL-CCNC: 12 U/L — LOW (ref 15–37)
AST SERPL-CCNC: 12 U/L — LOW (ref 15–37)
BILIRUB DIRECT SERPL-MCNC: 0 MG/DL — SIGNIFICANT CHANGE UP (ref 0–0.3)
BILIRUB INDIRECT FLD-MCNC: 0.3 MG/DL — SIGNIFICANT CHANGE UP (ref 0.2–1)
BILIRUB SERPL-MCNC: 0.3 MG/DL — SIGNIFICANT CHANGE UP (ref 0.2–1.2)
BILIRUB SERPL-MCNC: 0.3 MG/DL — SIGNIFICANT CHANGE UP (ref 0.2–1.2)
BUN SERPL-MCNC: 18 MG/DL — SIGNIFICANT CHANGE UP (ref 7–23)
CALCIUM SERPL-MCNC: 8.5 MG/DL — SIGNIFICANT CHANGE UP (ref 8.5–10.1)
CHLORIDE SERPL-SCNC: 98 MMOL/L — SIGNIFICANT CHANGE UP (ref 96–108)
CO2 SERPL-SCNC: >45 MMOL/L — CRITICAL HIGH (ref 22–31)
CREAT SERPL-MCNC: 0.5 MG/DL — SIGNIFICANT CHANGE UP (ref 0.5–1.3)
CREAT SERPL-MCNC: 0.5 MG/DL — SIGNIFICANT CHANGE UP (ref 0.5–1.3)
EGFR: 123 ML/MIN/1.73M2 — SIGNIFICANT CHANGE UP
EGFR: 123 ML/MIN/1.73M2 — SIGNIFICANT CHANGE UP
GLUCOSE BLDC GLUCOMTR-MCNC: 111 MG/DL — HIGH (ref 70–99)
GLUCOSE BLDC GLUCOMTR-MCNC: 129 MG/DL — HIGH (ref 70–99)
GLUCOSE BLDC GLUCOMTR-MCNC: 184 MG/DL — HIGH (ref 70–99)
GLUCOSE BLDC GLUCOMTR-MCNC: 97 MG/DL — SIGNIFICANT CHANGE UP (ref 70–99)
GLUCOSE SERPL-MCNC: 101 MG/DL — HIGH (ref 70–99)
HCT VFR BLD CALC: 40.1 % — SIGNIFICANT CHANGE UP (ref 39–50)
HGB BLD-MCNC: 11.8 G/DL — LOW (ref 13–17)
MAGNESIUM SERPL-MCNC: 2 MG/DL — SIGNIFICANT CHANGE UP (ref 1.6–2.6)
MCHC RBC-ENTMCNC: 27.3 PG — SIGNIFICANT CHANGE UP (ref 27–34)
MCHC RBC-ENTMCNC: 29.4 G/DL — LOW (ref 32–36)
MCV RBC AUTO: 92.6 FL — SIGNIFICANT CHANGE UP (ref 80–100)
NRBC # BLD: 0 /100 WBCS — SIGNIFICANT CHANGE UP (ref 0–0)
PHOSPHATE SERPL-MCNC: 3.5 MG/DL — SIGNIFICANT CHANGE UP (ref 2.5–4.5)
PLATELET # BLD AUTO: 205 K/UL — SIGNIFICANT CHANGE UP (ref 150–400)
POTASSIUM SERPL-MCNC: 4.4 MMOL/L — SIGNIFICANT CHANGE UP (ref 3.5–5.3)
POTASSIUM SERPL-SCNC: 4.4 MMOL/L — SIGNIFICANT CHANGE UP (ref 3.5–5.3)
PROT SERPL-MCNC: 6.9 GM/DL — SIGNIFICANT CHANGE UP (ref 6–8.3)
PROT SERPL-MCNC: 7 GM/DL — SIGNIFICANT CHANGE UP (ref 6–8.3)
RBC # BLD: 4.33 M/UL — SIGNIFICANT CHANGE UP (ref 4.2–5.8)
RBC # FLD: 13 % — SIGNIFICANT CHANGE UP (ref 10.3–14.5)
SODIUM SERPL-SCNC: 142 MMOL/L — SIGNIFICANT CHANGE UP (ref 135–145)
WBC # BLD: 8.57 K/UL — SIGNIFICANT CHANGE UP (ref 3.8–10.5)
WBC # FLD AUTO: 8.57 K/UL — SIGNIFICANT CHANGE UP (ref 3.8–10.5)

## 2022-12-20 PROCEDURE — 99233 SBSQ HOSP IP/OBS HIGH 50: CPT

## 2022-12-20 RX ORDER — TIOTROPIUM BROMIDE 18 UG/1
2 CAPSULE ORAL; RESPIRATORY (INHALATION) DAILY
Refills: 0 | Status: DISCONTINUED | OUTPATIENT
Start: 2022-12-20 | End: 2022-12-27

## 2022-12-20 RX ADMIN — REMDESIVIR 200 MILLIGRAM(S): 5 INJECTION INTRAVENOUS at 00:52

## 2022-12-20 RX ADMIN — Medication 2: at 11:52

## 2022-12-20 RX ADMIN — ENOXAPARIN SODIUM 40 MILLIGRAM(S): 100 INJECTION SUBCUTANEOUS at 22:00

## 2022-12-20 RX ADMIN — ENOXAPARIN SODIUM 40 MILLIGRAM(S): 100 INJECTION SUBCUTANEOUS at 11:50

## 2022-12-20 RX ADMIN — Medication 6 MILLIGRAM(S): at 06:15

## 2022-12-20 NOTE — PROGRESS NOTE ADULT - ASSESSMENT
52M PMH obesity, asthma, ?PRINCE, preDM presents for SOB, cough, fever. Reports  + COVID test day prior to admission. Found to be hypoxic by EMS to the 50s on RA. ABG 7.19/>112/155/44/98% on 4LNC. Placed on bipap. Admitted to medical floor for COVID    DX: acute on chronic hypercarbic respiratory failure, acute hypoxic respiratory failure, COVID PNA, acute metabolic encephalopathy, obesity, metabolic alkalosis      - pt clinically looks well, awake, alert, talking, making needs known  - pt with respiratory acidosis and metabolic alkalosis  - he has chronic hypercarbia CO2 retention likely due to underlying PRINCE  - bicarb likely elevated at baseline due to compensation of chronic hypercarbia  - bicarb on admission elevated but increasing on serum chemistry  - check AM ABG and repeat chemistry  - cont with nocturnal BiPAP  - nasal cannula supplementation during the day  - wean down FIO2 as tolerates  - maintain O2 sat > 90%  - cont remdesivir/dexamethasone for treatement of COVID  - DVT ppx  - spoke to father over the phone: reports pt has hx of asthma and reports pt to snore at night with sleep but unclear if he carries an official diagnosis of PRINCE, father states pt does not have a pulmonologist and will need one

## 2022-12-20 NOTE — PROGRESS NOTE ADULT - SUBJECTIVE AND OBJECTIVE BOX
Patient is a 52y old  Male who presents with a chief complaint of COVID-19 pneumonia (19 Dec 2022 08:46)    INTERVAL HPI/OVERNIGHT EVENTS: Patients seen and examined at bedside this morning. No acute events overnight. Pt able to make needs known. Does not like the tubing for the oxygen. States he will comply with wearing it.     MEDICATIONS  (STANDING):  dexAMETHasone     Tablet 6 milliGRAM(s) Oral daily  dextrose 5%. 1000 milliLiter(s) (100 mL/Hr) IV Continuous <Continuous>  dextrose 5%. 1000 milliLiter(s) (50 mL/Hr) IV Continuous <Continuous>  dextrose 50% Injectable 25 Gram(s) IV Push once  dextrose 50% Injectable 12.5 Gram(s) IV Push once  dextrose 50% Injectable 25 Gram(s) IV Push once  enoxaparin Injectable 40 milliGRAM(s) SubCutaneous every 12 hours  glucagon  Injectable 1 milliGRAM(s) IntraMuscular once  insulin lispro (ADMELOG) corrective regimen sliding scale   SubCutaneous three times a day before meals  remdesivir  IVPB   IV Intermittent   remdesivir  IVPB 100 milliGRAM(s) IV Intermittent every 24 hours    MEDICATIONS  (PRN):  acetaminophen     Tablet .. 650 milliGRAM(s) Oral every 6 hours PRN Temp greater or equal to 38C (100.4F), Mild Pain (1 - 3)  albuterol    90 MICROgram(s) HFA Inhaler 2 Puff(s) Inhalation every 6 hours PRN Shortness of Breath  benzonatate 100 milliGRAM(s) Oral every 8 hours PRN Cough  dextrose Oral Gel 15 Gram(s) Oral once PRN Blood Glucose LESS THAN 70 milliGRAM(s)/deciliter  melatonin 3 milliGRAM(s) Oral at bedtime PRN Insomnia    Allergies    penicillin (Unknown)    Intolerances      REVIEW OF SYSTEMS:  All other systems reviewed and are negative    Vital Signs Last 24 Hrs  T(C): 36.7 (20 Dec 2022 10:55), Max: 36.9 (20 Dec 2022 00:14)  T(F): 98 (20 Dec 2022 10:55), Max: 98.5 (20 Dec 2022 00:14)  HR: 108 (20 Dec 2022 10:55) (79 - 108)  BP: 128/84 (20 Dec 2022 10:55) (106/66 - 128/84)  BP(mean): --  RR: 20 (20 Dec 2022 10:55) (18 - 20)  SpO2: 90% (20 Dec 2022 10:55) (90% - 98%)    Parameters below as of 20 Dec 2022 10:55  Patient On (Oxygen Delivery Method): nasal cannula      Daily     Daily   I&O's Summary    19 Dec 2022 07:01  -  20 Dec 2022 07:00  --------------------------------------------------------  IN: 860 mL / OUT: 400 mL / NET: 460 mL      CAPILLARY BLOOD GLUCOSE      POCT Blood Glucose.: 184 mg/dL (20 Dec 2022 11:24)  POCT Blood Glucose.: 111 mg/dL (20 Dec 2022 07:54)  POCT Blood Glucose.: 109 mg/dL (19 Dec 2022 22:14)  POCT Blood Glucose.: 120 mg/dL (19 Dec 2022 16:16)    PHYSICAL EXAM:  CONSTITUTIONAL: Well groomed, mild respiratory distress on 6L NC  EYES: PERRLA and symmetric, EOMI, No conjunctival or scleral injection, non-icteric  ENMT: Oral mucosa with moist membranes. Normal dentition; no pharyngeal injection or exudates  NECK: Supple, symmetric and without tracheal deviation   RESP: No respiratory distress, no use of accessory muscles; CTA b/l, diffuse ronchi  CV: RRR, +S1S2, no MRG; no JVD; no peripheral edema  GI: Soft, NT, ND, no rebound, no guarding  LYMPH: No cervical LAD or tenderness; no axillary LAD or tenderness; no inguinal LAD or tenderness  MSK: Normal ROM without pain, no spinal tenderness, normal muscle strength/tone  SKIN: No rashes or ulcers noted; no subcutaneous nodules or induration palpable  NEURO: CN II-XII intact; sensation intact in upper and lower extremities b/l to light touch   PSYCH: Mood and affect appropriate, recent/remote memory intact      Labs                          11.8   8.57  )-----------( 205      ( 20 Dec 2022 06:48 )             40.1     12-20    142  |  98  |  18  ----------------------------<  101<H>  4.4   |  >45<HH>  |  0.50    Ca    8.5      20 Dec 2022 06:48  Phos  3.5     12-20  Mg     2.0     12-20    TPro  7.0  /  Alb  2.3<L>  /  TBili  0.3  /  DBili  0.0  /  AST  12<L>  /  ALT  21  /  AlkPhos  59  12-20                            Radiology and Imaging reviewed.

## 2022-12-20 NOTE — PROGRESS NOTE ADULT - ASSESSMENT
#Acute Hypoxic and Hypercapneic Respiratory Failure, Asthma   #Pneumonia 2/2 COVID-19   #Respiratory Acidosis with Metabolic Alkalosis   #Obesity Hypoventilation Syndrome    - Initial AB.19, CO2: 114, HCO3: 38  - () Bipap initiated, 16/ @50% transitioned to 6 L NC.  - Continuous pulse ox, monitor  - Titrate O2>92%  - Decadron 6mg qD x 10 days  - Albuterol, tylenol, tessalon PRN  - BMI of 38, Bicarb of 38  - Evaluate pt with sleep study for sleep apnea outpatient  - () Start remdesivir. C/W bipap. Pulm consult   () C/W remdesivir. Spoke to dad [phone], [phone] states that pt has asthma at home. Best spoken language is Lithuanian or farsi. Pt also speaks english    # DM Type II  - Mod ISS  - Hga1c: 7.0    Code: Full  VTE: Lovenox  Diet: Regular    Problem/Plan - 1:  ·  Problem: Sepsis with acute hypoxic respiratory failure.     Problem/Plan - 2:  ·  Problem: Pneumonia due to COVID-19 virus.     Problem/Plan - 3:  ·  Problem: Obesity hypoventilation syndrome.     Problem/Plan - 4:  ·  Problem: Acute respiratory failure with hypoxia and hypercapnia.     Problem/Plan - 5:  ·  Problem: Acute on chronic respiratory acidosis.

## 2022-12-20 NOTE — PROGRESS NOTE ADULT - SUBJECTIVE AND OBJECTIVE BOX
24 hr events:  no complaints  denies SOB  no acute events  on nocturnal bipap  transitioned to nasal cannula during the day and tolerating      ## ROS:  "i'm ok"  no complaints  no HA, no dizziness  no fever, no chills  no sore throat, no runny nose  no visual disturbances, no auditory changes  no SOB  no cough  no CP, no palpitations  no abdominal pain, no N/V/D  no dysuria  no muscle aches  no swelling    ## Labs:  CBC:                       11.8   8.57  )-----------( 205      ( 20 Dec 2022 06:48 )             40.1     12-20    142  |  98  |  18  ----------------------------<  101<H>  4.4   |  >45<HH>  |  0.50    Ca    8.5      20 Dec 2022 06:48  Phos  3.5     12-20  Mg     2.0     12-20    TPro  7.0  /  Alb  2.3<L>  /  TBili  0.3  /  DBili  0.0  /  AST  12<L>  /  ALT  21  /  AlkPhos  59  12-20      Flu With COVID-19 By MICHELLE (12.17.22 @ 18:07)    SARS-CoV-2 Result: Detected: EUA/IVD    Influenza A Result: NotDetec: EUA/IVD    Influenza B Result: NotDetec: EUA/IVD        ## Medications:  MEDICATIONS  (STANDING):  dexAMETHasone     Tablet 6 milliGRAM(s) Oral daily  dextrose 5%. 1000 milliLiter(s) (100 mL/Hr) IV Continuous <Continuous>  dextrose 5%. 1000 milliLiter(s) (50 mL/Hr) IV Continuous <Continuous>  dextrose 50% Injectable 25 Gram(s) IV Push once  dextrose 50% Injectable 12.5 Gram(s) IV Push once  dextrose 50% Injectable 25 Gram(s) IV Push once  enoxaparin Injectable 40 milliGRAM(s) SubCutaneous every 12 hours  glucagon  Injectable 1 milliGRAM(s) IntraMuscular once  insulin lispro (ADMELOG) corrective regimen sliding scale   SubCutaneous three times a day before meals  remdesivir  IVPB   IV Intermittent   remdesivir  IVPB 100 milliGRAM(s) IV Intermittent every 24 hours    MEDICATIONS  (PRN):  acetaminophen     Tablet .. 650 milliGRAM(s) Oral every 6 hours PRN Temp greater or equal to 38C (100.4F), Mild Pain (1 - 3)  albuterol    90 MICROgram(s) HFA Inhaler 2 Puff(s) Inhalation every 6 hours PRN Shortness of Breath  benzonatate 100 milliGRAM(s) Oral every 8 hours PRN Cough  dextrose Oral Gel 15 Gram(s) Oral once PRN Blood Glucose LESS THAN 70 milliGRAM(s)/deciliter  melatonin 3 milliGRAM(s) Oral at bedtime PRN Insomnia        ## Vitals:  T(C): 36.7 (20 Dec 2022 10:55), Max: 36.9 (20 Dec 2022 00:14)  T(F): 98 (20 Dec 2022 10:55), Max: 98.5 (20 Dec 2022 00:14)  HR: 108 (20 Dec 2022 10:55) (79 - 108)  BP: 128/84 (20 Dec 2022 10:55) (106/66 - 128/84)  RR: 20 (20 Dec 2022 10:55) (18 - 20)  SpO2: 90% (20 Dec 2022 10:55) (90% - 98%)    Parameters below as of 20 Dec 2022 10:55  Patient On (Oxygen Delivery Method): nasal cannula    Blood Gas Profile - Arterial (12.18.22 @ 05:07)    pH, Arterial: 7.31    pCO2, Arterial: 81 mmHg    pO2, Arterial: 129 mmHg    HCO3, Arterial: 41 mmol/L    Base Excess, Arterial: 11.0 mmol/L    Oxygen Saturation, Arterial: 98.7 %    Total CO2, Arterial: 43 mmol/L    FIO2, Arterial: 60    Blood Gas Comments Arterial: bipap 16/8/60%           ## P/E:  Gen: obese male, lying comfortably in bed in no apparent distress  HEENT: EOMI, sclera white, no JVD  Resp: CTA B/L no wheeze  CVS: RRR  Abd: soft NT/ND +BS  Ext: no c/c/e  Neuro: A&Ox3, makes needs known, moves all extremities equally

## 2022-12-21 LAB
ALBUMIN SERPL ELPH-MCNC: 2.3 G/DL — LOW (ref 3.3–5)
ALP SERPL-CCNC: 58 U/L — SIGNIFICANT CHANGE UP (ref 40–120)
ALT FLD-CCNC: 19 U/L — SIGNIFICANT CHANGE UP (ref 12–78)
ANION GAP SERPL CALC-SCNC: <1 MMOL/L — LOW (ref 5–17)
AST SERPL-CCNC: 13 U/L — LOW (ref 15–37)
BASE EXCESS BLDA CALC-SCNC: 22 MMOL/L — HIGH (ref -2–3)
BILIRUB DIRECT SERPL-MCNC: <0.05 — SIGNIFICANT CHANGE UP (ref 0–0.3)
BILIRUB SERPL-MCNC: 0.3 MG/DL — SIGNIFICANT CHANGE UP (ref 0.2–1.2)
BLOOD GAS COMMENTS ARTERIAL: SIGNIFICANT CHANGE UP
BUN SERPL-MCNC: 21 MG/DL — SIGNIFICANT CHANGE UP (ref 7–23)
CALCIUM SERPL-MCNC: 8.7 MG/DL — SIGNIFICANT CHANGE UP (ref 8.5–10.1)
CHLORIDE SERPL-SCNC: 95 MMOL/L — LOW (ref 96–108)
CO2 BLDA-SCNC: 54 MMOL/L — HIGH (ref 19–24)
CO2 SERPL-SCNC: >45 MMOL/L — CRITICAL HIGH (ref 22–31)
CREAT SERPL-MCNC: 0.55 MG/DL — SIGNIFICANT CHANGE UP (ref 0.5–1.3)
EGFR: 119 ML/MIN/1.73M2 — SIGNIFICANT CHANGE UP
GAS PNL BLDA: SIGNIFICANT CHANGE UP
GLUCOSE BLDC GLUCOMTR-MCNC: 107 MG/DL — HIGH (ref 70–99)
GLUCOSE BLDC GLUCOMTR-MCNC: 115 MG/DL — HIGH (ref 70–99)
GLUCOSE BLDC GLUCOMTR-MCNC: 176 MG/DL — HIGH (ref 70–99)
GLUCOSE BLDC GLUCOMTR-MCNC: 198 MG/DL — HIGH (ref 70–99)
GLUCOSE SERPL-MCNC: 94 MG/DL — SIGNIFICANT CHANGE UP (ref 70–99)
HCO3 BLDA-SCNC: 51 MMOL/L — CRITICAL HIGH (ref 21–28)
HCT VFR BLD CALC: 40.4 % — SIGNIFICANT CHANGE UP (ref 39–50)
HGB BLD-MCNC: 11.8 G/DL — LOW (ref 13–17)
HOROWITZ INDEX BLDA+IHG-RTO: 36 — SIGNIFICANT CHANGE UP
MAGNESIUM SERPL-MCNC: 2 MG/DL — SIGNIFICANT CHANGE UP (ref 1.6–2.6)
MCHC RBC-ENTMCNC: 27 PG — SIGNIFICANT CHANGE UP (ref 27–34)
MCHC RBC-ENTMCNC: 29.2 G/DL — LOW (ref 32–36)
MCV RBC AUTO: 92.4 FL — SIGNIFICANT CHANGE UP (ref 80–100)
NRBC # BLD: 0 /100 WBCS — SIGNIFICANT CHANGE UP (ref 0–0)
PCO2 BLDA: 77 MMHG — CRITICAL HIGH (ref 32–46)
PH BLDA: 7.43 — SIGNIFICANT CHANGE UP (ref 7.35–7.45)
PHOSPHATE SERPL-MCNC: 3.8 MG/DL — SIGNIFICANT CHANGE UP (ref 2.5–4.5)
PLATELET # BLD AUTO: 221 K/UL — SIGNIFICANT CHANGE UP (ref 150–400)
PO2 BLDA: 43 MMHG — LOW (ref 83–108)
POTASSIUM SERPL-MCNC: 4.1 MMOL/L — SIGNIFICANT CHANGE UP (ref 3.5–5.3)
POTASSIUM SERPL-SCNC: 4.1 MMOL/L — SIGNIFICANT CHANGE UP (ref 3.5–5.3)
PROT SERPL-MCNC: 6.9 GM/DL — SIGNIFICANT CHANGE UP (ref 6–8.3)
RBC # BLD: 4.37 M/UL — SIGNIFICANT CHANGE UP (ref 4.2–5.8)
RBC # FLD: 13 % — SIGNIFICANT CHANGE UP (ref 10.3–14.5)
SAO2 % BLDA: 71.5 % — LOW (ref 94–98)
SODIUM SERPL-SCNC: 141 MMOL/L — SIGNIFICANT CHANGE UP (ref 135–145)
WBC # BLD: 9 K/UL — SIGNIFICANT CHANGE UP (ref 3.8–10.5)
WBC # FLD AUTO: 9 K/UL — SIGNIFICANT CHANGE UP (ref 3.8–10.5)

## 2022-12-21 PROCEDURE — 99233 SBSQ HOSP IP/OBS HIGH 50: CPT

## 2022-12-21 PROCEDURE — 99233 SBSQ HOSP IP/OBS HIGH 50: CPT | Mod: 25

## 2022-12-21 PROCEDURE — 76604 US EXAM CHEST: CPT | Mod: 26

## 2022-12-21 RX ORDER — ACETAZOLAMIDE 250 MG/1
500 TABLET ORAL ONCE
Refills: 0 | Status: COMPLETED | OUTPATIENT
Start: 2022-12-21 | End: 2022-12-21

## 2022-12-21 RX ADMIN — Medication 6 MILLIGRAM(S): at 06:06

## 2022-12-21 RX ADMIN — TIOTROPIUM BROMIDE 2 PUFF(S): 18 CAPSULE ORAL; RESPIRATORY (INHALATION) at 11:50

## 2022-12-21 RX ADMIN — REMDESIVIR 200 MILLIGRAM(S): 5 INJECTION INTRAVENOUS at 00:48

## 2022-12-21 RX ADMIN — ACETAZOLAMIDE 500 MILLIGRAM(S): 250 TABLET ORAL at 11:49

## 2022-12-21 RX ADMIN — ENOXAPARIN SODIUM 40 MILLIGRAM(S): 100 INJECTION SUBCUTANEOUS at 10:22

## 2022-12-21 RX ADMIN — Medication 2: at 11:53

## 2022-12-21 RX ADMIN — REMDESIVIR 200 MILLIGRAM(S): 5 INJECTION INTRAVENOUS at 21:50

## 2022-12-21 RX ADMIN — ENOXAPARIN SODIUM 40 MILLIGRAM(S): 100 INJECTION SUBCUTANEOUS at 21:48

## 2022-12-21 NOTE — PROGRESS NOTE ADULT - SUBJECTIVE AND OBJECTIVE BOX
24 hr events:  feels well  no cough  denies SOB  used nocturnal bipap  on nasal cannula 6L  speaking full sentences  no complaints  blood gas this morning 7.43/77/43/51/72% (?venous or mixed gas)    ## ROS:  [ ] unable to obtain  CONSTITUTIONAL: No fever, weight loss, or fatigue  EYES: No eye pain, visual disturbances, or discharge  ENMT:  No difficulty hearing, tinnitus, vertigo; No sinus or throat pain  NECK: No pain or stiffness  RESPIRATORY: No cough, wheezing, chills or hemoptysis; No shortness of breath  CARDIOVASCULAR: No chest pain, palpitations, dizziness, or leg swelling  GASTROINTESTINAL: No abdominal or epigastric pain. No nausea, vomiting, or hematemesis; No diarrhea or constipation. No melena or hematochezia.  GENITOURINARY: No dysuria, frequency, hematuria, or incontinence  NEUROLOGICAL: No headaches, memory loss, loss of strength, numbness, or tremors  SKIN: No itching, burning, rashes, or lesions   LYMPH NODES: No enlarged glands  ENDOCRINE: No heat or cold intolerance; No hair loss  MUSCULOSKELETAL: No joint pain or swelling; No muscle, back, or extremity pain  PSYCHIATRIC: No depression, anxiety, mood swings, or difficulty sleeping  HEME/LYMPH: No easy bruising, or bleeding gums  ALLERGY AND IMMUNOLOGIC: No hives or eczema    ## Labs:  CBC:                        11.8   9.00  )-----------( 221      ( 21 Dec 2022 05:40 )             40.4     Chem:  12-21    141  |  95<L>  |  21  ----------------------------<  94  4.1   |  >45<HH>  |  0.55    Ca    8.7      21 Dec 2022 05:40  Phos  3.8     12-21  Mg     2.0     12-21    TPro  6.9  /  Alb  2.3<L>  /  TBili  0.3  /  DBili  <0.05  /  AST  13<L>  /  ALT  19  /  AlkPhos  58  12-21    Coags:          ## Imaging:    ## Medications:  remdesivir  IVPB   IV Intermittent   remdesivir  IVPB 100 milliGRAM(s) IV Intermittent every 24 hours    acetaZOLAMIDE Injectable 500 milliGRAM(s) IV Push once    albuterol    90 MICROgram(s) HFA Inhaler 2 Puff(s) Inhalation every 6 hours PRN  benzonatate 100 milliGRAM(s) Oral every 8 hours PRN  tiotropium 2.5 MICROgram(s) Inhaler 2 Puff(s) Inhalation daily    dexAMETHasone     Tablet 6 milliGRAM(s) Oral daily  dextrose 50% Injectable 25 Gram(s) IV Push once  dextrose 50% Injectable 12.5 Gram(s) IV Push once  dextrose 50% Injectable 25 Gram(s) IV Push once  dextrose Oral Gel 15 Gram(s) Oral once PRN  glucagon  Injectable 1 milliGRAM(s) IntraMuscular once  insulin lispro (ADMELOG) corrective regimen sliding scale   SubCutaneous three times a day before meals    enoxaparin Injectable 40 milliGRAM(s) SubCutaneous every 12 hours      acetaminophen     Tablet .. 650 milliGRAM(s) Oral every 6 hours PRN  melatonin 3 milliGRAM(s) Oral at bedtime PRN      ## Vitals:  T(C): 37 (12-21-22 @ 10:15), Max: 37 (12-21-22 @ 10:15)  HR: 84 (12-21-22 @ 10:15) (74 - 101)  BP: 125/84 (12-21-22 @ 10:15) (108/74 - 125/84)  BP(mean): --  RR: 20 (12-21-22 @ 10:15) (17 - 20)  SpO2: 100% (12-21-22 @ 10:15) (91% - 100%)  Wt(kg): --  Vent:   ABG: ABG - ( 21 Dec 2022 10:01 )  pH, Arterial: 7.43  pH, Blood: x     /  pCO2: 77    /  pO2: 43    / HCO3: 51    / Base Excess: 22.0  /  SaO2: 71.5                  12-20 @ 07:01  -  12-21 @ 07:00  --------------------------------------------------------  IN: 680 mL / OUT: 0 mL / NET: 680 mL          ## P/E:  Gen: lying comfortably in bed in no apparent distress  HEENT: PERRL, EOMI  Resp: CTA B/L no c/r/w  CVS: S1S2 no m/r/g  Abd: soft NT/ND +BS  Ext: no c/c/e  Neuro: A&Ox3    CENTRAL LINE: [ ] YES [ ] NO  LOCATION:   DATE INSERTED:  REMOVE: [ ] YES [ ] NO      FLORES: [ ] YES [ ] NO    DATE INSERTED:  REMOVE:  [ ] YES [ ] NO      A-LINE:  [ ] YES [ ] NO  LOCATION:   DATE INSERTED:  REMOVE:  [ ] YES [ ] NO  EXPLAIN:    GLOBAL ISSUE/BEST PRACTICE:  Analgesia:  Sedation:  HOB elevation: yes  Stress ulcer prophylaxis:  VTE prophylaxis:  Oral Care:  Glycemic control:  Nutrition:    CODE STATUS: [ ] full code  [ ] DNR  [ ] DNI  [ ] MOLST  Goals of care discussion: [ ] yes  24 hr events:  feels well  no cough  denies SOB  used nocturnal bipap  on nasal cannula 6L  speaking full sentences  no complaints  blood gas this morning 7.43/77/43/51/72% (?venous or mixed gas)    ## ROS:  feels well  no complaints  no HA, no dizziness  no fever, no chills  no sore throat, no runny nose  no visual disturbances, no auditory changes  no SOB  no cough  no CP, no palpitations  no abdominal pain, no N/V/D  no dysuria  no muscle aches  no swelling    ## Labs:  CBC:                        11.8   9.00  )-----------( 221      ( 21 Dec 2022 05:40 )             40.4     Chem:  12-21    141  |  95<L>  |  21  ----------------------------<  94  4.1   |  >45<HH>  |  0.55    Ca    8.7      21 Dec 2022 05:40  Phos  3.8     12-21  Mg     2.0     12-21    TPro  6.9  /  Alb  2.3<L>  /  TBili  0.3  /  DBili  <0.05  /  AST  13<L>  /  ALT  19  /  AlkPhos  58  12-21        ## Medications:  remdesivir  IVPB   IV Intermittent   remdesivir  IVPB 100 milliGRAM(s) IV Intermittent every 24 hours    acetaZOLAMIDE Injectable 500 milliGRAM(s) IV Push once    albuterol    90 MICROgram(s) HFA Inhaler 2 Puff(s) Inhalation every 6 hours PRN  benzonatate 100 milliGRAM(s) Oral every 8 hours PRN  tiotropium 2.5 MICROgram(s) Inhaler 2 Puff(s) Inhalation daily    dexAMETHasone     Tablet 6 milliGRAM(s) Oral daily  dextrose 50% Injectable 25 Gram(s) IV Push once  dextrose 50% Injectable 12.5 Gram(s) IV Push once  dextrose 50% Injectable 25 Gram(s) IV Push once  dextrose Oral Gel 15 Gram(s) Oral once PRN  glucagon  Injectable 1 milliGRAM(s) IntraMuscular once  insulin lispro (ADMELOG) corrective regimen sliding scale   SubCutaneous three times a day before meals    enoxaparin Injectable 40 milliGRAM(s) SubCutaneous every 12 hours      acetaminophen     Tablet .. 650 milliGRAM(s) Oral every 6 hours PRN  melatonin 3 milliGRAM(s) Oral at bedtime PRN      ## Vitals:  T(C): 37 (12-21-22 @ 10:15), Max: 37 (12-21-22 @ 10:15)  HR: 84 (12-21-22 @ 10:15) (74 - 101)  BP: 125/84 (12-21-22 @ 10:15) (108/74 - 125/84)  RR: 20 (12-21-22 @ 10:15) (17 - 20)  SpO2: 100% (12-21-22 @ 10:15) (91% - 100%)    ABG: ABG - ( 21 Dec 2022 10:01 )  pH, Arterial: 7.43    /  pCO2: 77    /  pO2: 43    / HCO3: 51    / Base Excess: 22.0  /  SaO2: 71.5        ## P/E:  Gen: obese male, lying comfortably in bed in no apparent distress  HEENT: EOMI, sclera white, no JVD  Resp: CTA B/L no wheeze  CVS: RRR  Abd: soft NT/ND +BS  Ext: no c/c/e  Neuro: A&Ox3

## 2022-12-21 NOTE — PROGRESS NOTE ADULT - SUBJECTIVE AND OBJECTIVE BOX
Patient is a 52y old  Male who presents with a chief complaint of COVID-19 pneumonia (19 Dec 2022 08:46)    INTERVAL HPI/OVERNIGHT EVENTS:   Patient seen and examined bedside.   no overnight events   compliant with oxygen   ABG this AM was most likely venous   patient comfortable on 6L NC     REVIEW OF SYSTEMS: remaining ROS negative     MEDICATIONS  (STANDING):  dexAMETHasone     Tablet 6 milliGRAM(s) Oral daily  dextrose 5%. 1000 milliLiter(s) (100 mL/Hr) IV Continuous <Continuous>  dextrose 5%. 1000 milliLiter(s) (50 mL/Hr) IV Continuous <Continuous>  dextrose 50% Injectable 25 Gram(s) IV Push once  dextrose 50% Injectable 12.5 Gram(s) IV Push once  dextrose 50% Injectable 25 Gram(s) IV Push once  enoxaparin Injectable 40 milliGRAM(s) SubCutaneous every 12 hours  glucagon  Injectable 1 milliGRAM(s) IntraMuscular once  insulin lispro (ADMELOG) corrective regimen sliding scale   SubCutaneous three times a day before meals  remdesivir  IVPB   IV Intermittent   remdesivir  IVPB 100 milliGRAM(s) IV Intermittent every 24 hours    MEDICATIONS  (PRN):  acetaminophen     Tablet .. 650 milliGRAM(s) Oral every 6 hours PRN Temp greater or equal to 38C (100.4F), Mild Pain (1 - 3)  albuterol    90 MICROgram(s) HFA Inhaler 2 Puff(s) Inhalation every 6 hours PRN Shortness of Breath  benzonatate 100 milliGRAM(s) Oral every 8 hours PRN Cough  dextrose Oral Gel 15 Gram(s) Oral once PRN Blood Glucose LESS THAN 70 milliGRAM(s)/deciliter  melatonin 3 milliGRAM(s) Oral at bedtime PRN Insomnia    Allergies    penicillin (Unknown)    Intolerances      T(C): 37 (12-21-22 @ 10:15), Max: 37 (12-21-22 @ 10:15)  HR: 84 (12-21-22 @ 10:15) (74 - 101)  BP: 125/84 (12-21-22 @ 10:15) (108/74 - 125/84)  BP(mean): --  RR: 20 (12-21-22 @ 10:15) (17 - 20)  SpO2: 100% (12-21-22 @ 10:15) (91% - 100%)  Wt(kg): --          Labs                                     11.8   9.00  )-----------( 221      ( 21 Dec 2022 05:40 )             40.4   12-21    141  |  95<L>  |  21  ----------------------------<  94  4.1   |  >45<HH>  |  0.55    Ca    8.7      21 Dec 2022 05:40  Phos  3.8     12-21  Mg     2.0     12-21    TPro  6.9  /  Alb  2.3<L>  /  TBili  0.3  /  DBili  <0.05  /  AST  13<L>  /  ALT  19  /  AlkPhos  58  12-21        Consultant(s) Notes Reveiwed [x ] Yes     Care Discussed with [ x] Consultants  [ x] Patient  [x ] Family --father over the phone [ x] /   [x ] Other; RN Patient is a 52y old  Male who presents with a chief complaint of COVID-19 pneumonia (19 Dec 2022 08:46)    INTERVAL HPI/OVERNIGHT EVENTS:   Patient seen and examined bedside.   no overnight events   compliant with oxygen   ABG this AM was most likely venous   patient comfortable on 6L NC     REVIEW OF SYSTEMS: remaining ROS negative     MEDICATIONS  (STANDING):  dexAMETHasone     Tablet 6 milliGRAM(s) Oral daily  dextrose 5%. 1000 milliLiter(s) (100 mL/Hr) IV Continuous <Continuous>  dextrose 5%. 1000 milliLiter(s) (50 mL/Hr) IV Continuous <Continuous>  dextrose 50% Injectable 25 Gram(s) IV Push once  dextrose 50% Injectable 12.5 Gram(s) IV Push once  dextrose 50% Injectable 25 Gram(s) IV Push once  enoxaparin Injectable 40 milliGRAM(s) SubCutaneous every 12 hours  glucagon  Injectable 1 milliGRAM(s) IntraMuscular once  insulin lispro (ADMELOG) corrective regimen sliding scale   SubCutaneous three times a day before meals  remdesivir  IVPB   IV Intermittent   remdesivir  IVPB 100 milliGRAM(s) IV Intermittent every 24 hours    MEDICATIONS  (PRN):  acetaminophen     Tablet .. 650 milliGRAM(s) Oral every 6 hours PRN Temp greater or equal to 38C (100.4F), Mild Pain (1 - 3)  albuterol    90 MICROgram(s) HFA Inhaler 2 Puff(s) Inhalation every 6 hours PRN Shortness of Breath  benzonatate 100 milliGRAM(s) Oral every 8 hours PRN Cough  dextrose Oral Gel 15 Gram(s) Oral once PRN Blood Glucose LESS THAN 70 milliGRAM(s)/deciliter  melatonin 3 milliGRAM(s) Oral at bedtime PRN Insomnia    Allergies    penicillin (Unknown)    Intolerances      T(C): 37 (12-21-22 @ 10:15), Max: 37 (12-21-22 @ 10:15)  HR: 84 (12-21-22 @ 10:15) (74 - 101)  BP: 125/84 (12-21-22 @ 10:15) (108/74 - 125/84)  BP(mean): --  RR: 20 (12-21-22 @ 10:15) (17 - 20)  SpO2: 100% (12-21-22 @ 10:15) (91% - 100%)  Wt(kg): --    GENERAL: NAD , no increased WOB, speaking in full sentences , nontoxic appearing   HEAD:  Atraumatic, Normocephalic  EYES: EOMI, PERRLA, conjunctiva and sclera clear  ENMT: No tonsillar erythema, exudates, or enlargement; Moist mucous membranes   NECK: Supple, No JVD   NERVOUS SYSTEM:  Alert & Oriented X3, Good concentration; nonfocal   CHEST/LUNG: CTAB;  No rales, rhonchi, wheezing, or rubs  HEART: Regular rate and rhythm; No murmurs, rubs, or gallops  ABDOMEN: Soft, Nontender, Nondistended; Bowel sounds present  EXTREMITIES:  2+ Peripheral Pulses b/l, No clubbing, cyanosis, or edema b/l           Labs                                     11.8   9.00  )-----------( 221      ( 21 Dec 2022 05:40 )             40.4   12-21    141  |  95<L>  |  21  ----------------------------<  94  4.1   |  >45<HH>  |  0.55    Ca    8.7      21 Dec 2022 05:40  Phos  3.8     12-21  Mg     2.0     12-21    TPro  6.9  /  Alb  2.3<L>  /  TBili  0.3  /  DBili  <0.05  /  AST  13<L>  /  ALT  19  /  AlkPhos  58  12-21        Consultant(s) Notes Reveiwed [x ] Yes     Care Discussed with [ x] Consultants  [ x] Patient  [x ] Family --father over the phone [ x] /   [x ] Other; RN

## 2022-12-21 NOTE — PROGRESS NOTE ADULT - ASSESSMENT
#Acute Hypoxic and Hypercapneic Respiratory Failure, Asthma   #  COVID-19   #Respiratory Acidosis with Metabolic Alkalosis   #Obesity Hypoventilation Syndrome    - Initial AB.19, CO2: 114, HCO3: 38  - () Bipap initiated, / @50% transitioned to 6 L NC.  - Continuous pulse ox, monitor  - Titrate O2>92%  - Decadron 6mg qD x 10 days  - Albuterol, tylenol, tessalon PRN  - BMI of 38, Bicarb of 38  - Evaluate pt with sleep study for sleep apnea outpatient  - () Start remdesivir. C/W bipap. Pulm consult   () C/W remdesivir. Spoke to dad [phone], [phone] states that pt has asthma at home. Best spoken language is Yoruba or farsi. Pt also speaks english   given diomox x 1 for met alk     # DM Type II  - Mod ISS  - Hga1c: 7.0    Code: Full  VTE: Lovenox  Diet: Regular    Problem/Plan - 1:  ·  Problem: Sepsis with acute hypoxic respiratory failure.     Problem/Plan - 2:  ·  Problem: Pneumonia due to COVID-19 virus.     Problem/Plan - 3:  ·  Problem: Obesity hypoventilation syndrome.     Problem/Plan - 4:  ·  Problem: Acute respiratory failure with hypoxia and hypercapnia.     Problem/Plan - 5:  ·  Problem: Acute on chronic respiratory acidosis.

## 2022-12-21 NOTE — PROGRESS NOTE ADULT - ASSESSMENT
52M PMH obesity, asthma, PRINCE, preDM presents for SOB, cough, fever Reports  + COVID test day prior to admission. Found to be hypoxic by EMS to the 50s on RA. ABG 7.19/>112/155/44/98% on 4LNC. Placed on bipap. Admitted to medical floor for COVID    DX: acute on chronic hypercarbic respiratory failure, acute hypoxic respiratory failure, COVID PNA, acute metabolic encephalopathy, obesity    POCUS lung: scattered focal B lines bilaterally, + lung sliding, very small / trace bilateral pleural effusions, consolidative lung pattern at bases consistent with PNA +/- atelectasis    - pt clinically looks well, awake, alert, talking, making needs known  - blood gas this morning likely a venous gas  - pt with respiratory acidosis and metabolic alkalosis  - he has chronic hypercarbia CO2 retention likely due to underlying PRINCE  - bicarb on admission elevated but now in the 50s  - will give diamox for metabolic alkalosis  - pt has not been diuresed and is not on diuretics at home and denies diarrhea to explain for uptrending alkalosis. he likely has baseline elevated bicarb in response/compensation to his chronic hypercarbia  - will repeat ABG in AM  - cont with nocturnal BiPAP  - nasal cannula supplementation during the day  - wean down FIO2 as tolerates  - maintain O2 sat > 90%  - cont remdesivir/dexamethasone for treatement of COVID  - DVT ppx

## 2022-12-22 LAB
ALBUMIN SERPL ELPH-MCNC: 2.4 G/DL — LOW (ref 3.3–5)
ALP SERPL-CCNC: 59 U/L — SIGNIFICANT CHANGE UP (ref 40–120)
ALT FLD-CCNC: 24 U/L — SIGNIFICANT CHANGE UP (ref 12–78)
ANION GAP SERPL CALC-SCNC: 6 MMOL/L — SIGNIFICANT CHANGE UP (ref 5–17)
AST SERPL-CCNC: 12 U/L — LOW (ref 15–37)
BASE EXCESS BLDA CALC-SCNC: 12 MMOL/L — HIGH (ref -2–3)
BILIRUB DIRECT SERPL-MCNC: <0.05 — SIGNIFICANT CHANGE UP (ref 0–0.3)
BILIRUB INDIRECT FLD-MCNC: SIGNIFICANT CHANGE UP (ref 0.2–1)
BILIRUB SERPL-MCNC: 0.3 MG/DL — SIGNIFICANT CHANGE UP (ref 0.2–1.2)
BLOOD GAS COMMENTS ARTERIAL: SIGNIFICANT CHANGE UP
BUN SERPL-MCNC: 25 MG/DL — HIGH (ref 7–23)
CALCIUM SERPL-MCNC: 8.9 MG/DL — SIGNIFICANT CHANGE UP (ref 8.5–10.1)
CHLORIDE SERPL-SCNC: 102 MMOL/L — SIGNIFICANT CHANGE UP (ref 96–108)
CO2 BLDA-SCNC: 42 MMOL/L — HIGH (ref 19–24)
CO2 SERPL-SCNC: 31 MMOL/L — SIGNIFICANT CHANGE UP (ref 22–31)
CREAT SERPL-MCNC: 0.72 MG/DL — SIGNIFICANT CHANGE UP (ref 0.5–1.3)
EGFR: 110 ML/MIN/1.73M2 — SIGNIFICANT CHANGE UP
GAS PNL BLDA: SIGNIFICANT CHANGE UP
GLUCOSE BLDC GLUCOMTR-MCNC: 104 MG/DL — HIGH (ref 70–99)
GLUCOSE BLDC GLUCOMTR-MCNC: 125 MG/DL — HIGH (ref 70–99)
GLUCOSE BLDC GLUCOMTR-MCNC: 126 MG/DL — HIGH (ref 70–99)
GLUCOSE BLDC GLUCOMTR-MCNC: 211 MG/DL — HIGH (ref 70–99)
GLUCOSE SERPL-MCNC: 108 MG/DL — HIGH (ref 70–99)
HCO3 BLDA-SCNC: 40 MMOL/L — HIGH (ref 21–28)
HOROWITZ INDEX BLDA+IHG-RTO: 45 — SIGNIFICANT CHANGE UP
LEGIONELLA AG UR QL: NEGATIVE — SIGNIFICANT CHANGE UP
PCO2 BLDA: 68 MMHG — HIGH (ref 32–46)
PH BLDA: 7.38 — SIGNIFICANT CHANGE UP (ref 7.35–7.45)
PO2 BLDA: 64 MMHG — LOW (ref 83–108)
POTASSIUM SERPL-MCNC: 4.4 MMOL/L — SIGNIFICANT CHANGE UP (ref 3.5–5.3)
POTASSIUM SERPL-SCNC: 4.4 MMOL/L — SIGNIFICANT CHANGE UP (ref 3.5–5.3)
PROT SERPL-MCNC: 7.4 GM/DL — SIGNIFICANT CHANGE UP (ref 6–8.3)
S PNEUM AG UR QL: NEGATIVE — SIGNIFICANT CHANGE UP
SAO2 % BLDA: 92.8 % — LOW (ref 94–98)
SODIUM SERPL-SCNC: 139 MMOL/L — SIGNIFICANT CHANGE UP (ref 135–145)

## 2022-12-22 PROCEDURE — 99232 SBSQ HOSP IP/OBS MODERATE 35: CPT

## 2022-12-22 PROCEDURE — 99233 SBSQ HOSP IP/OBS HIGH 50: CPT

## 2022-12-22 RX ADMIN — Medication 6 MILLIGRAM(S): at 06:10

## 2022-12-22 RX ADMIN — Medication 4: at 11:21

## 2022-12-22 RX ADMIN — ENOXAPARIN SODIUM 40 MILLIGRAM(S): 100 INJECTION SUBCUTANEOUS at 11:21

## 2022-12-22 RX ADMIN — TIOTROPIUM BROMIDE 2 PUFF(S): 18 CAPSULE ORAL; RESPIRATORY (INHALATION) at 11:12

## 2022-12-22 RX ADMIN — ENOXAPARIN SODIUM 40 MILLIGRAM(S): 100 INJECTION SUBCUTANEOUS at 21:32

## 2022-12-22 NOTE — PROGRESS NOTE ADULT - SUBJECTIVE AND OBJECTIVE BOX
24 hr events:  no acute events  feels well  on nocturnal bipap  nasal cannula during the day  denies SOB, cough  s/p Diamox yesterday with improved alkalosis on blood gas today      ROS  feels well  no complaints  no HA, no dizziness  no fever, no chills  no sore throat, no runny nose  no visual disturbances, no auditory changes  no SOB  no cough  no CP, no palpitations  no abdominal pain, no N/V/D  no dysuria  no muscle aches  no swelling      MEDICATIONS  (STANDING):  dexAMETHasone     Tablet 6 milliGRAM(s) Oral daily  dextrose 5%. 1000 milliLiter(s) (100 mL/Hr) IV Continuous <Continuous>  dextrose 5%. 1000 milliLiter(s) (50 mL/Hr) IV Continuous <Continuous>  dextrose 50% Injectable 25 Gram(s) IV Push once  dextrose 50% Injectable 12.5 Gram(s) IV Push once  dextrose 50% Injectable 25 Gram(s) IV Push once  enoxaparin Injectable 40 milliGRAM(s) SubCutaneous every 12 hours  glucagon  Injectable 1 milliGRAM(s) IntraMuscular once  insulin lispro (ADMELOG) corrective regimen sliding scale   SubCutaneous three times a day before meals  tiotropium 2.5 MICROgram(s) Inhaler 2 Puff(s) Inhalation daily    MEDICATIONS  (PRN):  acetaminophen     Tablet .. 650 milliGRAM(s) Oral every 6 hours PRN Temp greater or equal to 38C (100.4F), Mild Pain (1 - 3)  albuterol    90 MICROgram(s) HFA Inhaler 2 Puff(s) Inhalation every 6 hours PRN Shortness of Breath  benzonatate 100 milliGRAM(s) Oral every 8 hours PRN Cough  dextrose Oral Gel 15 Gram(s) Oral once PRN Blood Glucose LESS THAN 70 milliGRAM(s)/deciliter  melatonin 3 milliGRAM(s) Oral at bedtime PRN Insomnia    LABS    139  |  102  |  25<H>  ----------------------------<  108<H>  4.4   |  31  |  0.72    Ca    8.9      22 Dec 2022 07:50    TPro  7.4  /  Alb  2.4<L>  /  TBili  0.3  /  DBili  <0.05  /  AST  12<L>  /  ALT  24  /  AlkPhos  59  12-22    ABG - ( 22 Dec 2022 06:19 )  pH, Arterial: 7.38  /  pCO2: 68    /  pO2: 64    / HCO3: 40    / Base Excess: 12.0  /  SaO2: 92.8        Vital Signs Last 24 Hrs  T(C): 36.8   T(F): 98.2   HR: 95 (80 - 95)  BP: 120/81  (108/72 - 120/82)  RR: 18  (18 - 19)  SpO2: 95%  (92% - 96%)    Patient On (Oxygen Delivery Method): nasal cannula  O2 Flow (L/min): 2      EXAM:  GEN: obese male, NAD  HEENT: NC/AT, short neck, EOMI, sclera white  CV: RRR  PULM: no wheeze, no rales ,no rhonchi, CTA bilaterally  ABD: obese, NT, + BS  EXT: no edema/no cyanosis, no clubbing  NEURO: awake, alert, speaking full sentences, moving all extremities no focal deificts

## 2022-12-22 NOTE — PROGRESS NOTE ADULT - SUBJECTIVE AND OBJECTIVE BOX
Patient is a 52y old  Male who presents with a chief complaint of COVID-19 pneumonia (19 Dec 2022 08:46)    INTERVAL HPI/OVERNIGHT EVENTS:   Patient seen and examined bedside.   no overnight events   now on 2 L NC and doing well,   bicarb improved after Diamox     REVIEW OF SYSTEMS: remaining ROS negative     MEDICATIONS  (STANDING):  dexAMETHasone     Tablet 6 milliGRAM(s) Oral daily  dextrose 5%. 1000 milliLiter(s) (100 mL/Hr) IV Continuous <Continuous>  dextrose 5%. 1000 milliLiter(s) (50 mL/Hr) IV Continuous <Continuous>  dextrose 50% Injectable 25 Gram(s) IV Push once  dextrose 50% Injectable 12.5 Gram(s) IV Push once  dextrose 50% Injectable 25 Gram(s) IV Push once  enoxaparin Injectable 40 milliGRAM(s) SubCutaneous every 12 hours  glucagon  Injectable 1 milliGRAM(s) IntraMuscular once  insulin lispro (ADMELOG) corrective regimen sliding scale   SubCutaneous three times a day before meals  remdesivir  IVPB   IV Intermittent   remdesivir  IVPB 100 milliGRAM(s) IV Intermittent every 24 hours    MEDICATIONS  (PRN):  acetaminophen     Tablet .. 650 milliGRAM(s) Oral every 6 hours PRN Temp greater or equal to 38C (100.4F), Mild Pain (1 - 3)  albuterol    90 MICROgram(s) HFA Inhaler 2 Puff(s) Inhalation every 6 hours PRN Shortness of Breath  benzonatate 100 milliGRAM(s) Oral every 8 hours PRN Cough  dextrose Oral Gel 15 Gram(s) Oral once PRN Blood Glucose LESS THAN 70 milliGRAM(s)/deciliter  melatonin 3 milliGRAM(s) Oral at bedtime PRN Insomnia    Allergies    penicillin (Unknown)    Intolerances      T(C): 37 (12-21-22 @ 10:15), Max: 37 (12-21-22 @ 10:15)  HR: 84 (12-21-22 @ 10:15) (74 - 101)  BP: 125/84 (12-21-22 @ 10:15) (108/74 - 125/84)  BP(mean): --  RR: 20 (12-21-22 @ 10:15) (17 - 20)  SpO2: 100% (12-21-22 @ 10:15) (91% - 100%)  Wt(kg): --          Labs    12-22    139  |  102  |  25<H>  ----------------------------<  108<H>  4.4   |  31  |  0.72    Ca    8.9      22 Dec 2022 07:50    TPro  7.4  /  Alb  2.4<L>  /  TBili  0.3  /  DBili  <0.05  /  AST  12<L>  /  ALT  24  /  AlkPhos  59  12-22        Consultant(s) Notes Reveiwed [x ] Yes     Care Discussed with [ x] Consultants  [ x] Patient  [x ] Family --father over the phone  [phone], [phone]  [ x] /   [x ] Other; RN Patient is a 52y old  Male who presents with a chief complaint of COVID-19 pneumonia (19 Dec 2022 08:46)    INTERVAL HPI/OVERNIGHT EVENTS:   Patient seen and examined bedside.   no overnight events   now on 2 L NC and doing well,   bicarb improved after Diamox     REVIEW OF SYSTEMS: remaining ROS negative     MEDICATIONS  (STANDING):  dexAMETHasone     Tablet 6 milliGRAM(s) Oral daily  dextrose 5%. 1000 milliLiter(s) (100 mL/Hr) IV Continuous <Continuous>  dextrose 5%. 1000 milliLiter(s) (50 mL/Hr) IV Continuous <Continuous>  dextrose 50% Injectable 25 Gram(s) IV Push once  dextrose 50% Injectable 12.5 Gram(s) IV Push once  dextrose 50% Injectable 25 Gram(s) IV Push once  enoxaparin Injectable 40 milliGRAM(s) SubCutaneous every 12 hours  glucagon  Injectable 1 milliGRAM(s) IntraMuscular once  insulin lispro (ADMELOG) corrective regimen sliding scale   SubCutaneous three times a day before meals  remdesivir  IVPB   IV Intermittent   remdesivir  IVPB 100 milliGRAM(s) IV Intermittent every 24 hours    MEDICATIONS  (PRN):  acetaminophen     Tablet .. 650 milliGRAM(s) Oral every 6 hours PRN Temp greater or equal to 38C (100.4F), Mild Pain (1 - 3)  albuterol    90 MICROgram(s) HFA Inhaler 2 Puff(s) Inhalation every 6 hours PRN Shortness of Breath  benzonatate 100 milliGRAM(s) Oral every 8 hours PRN Cough  dextrose Oral Gel 15 Gram(s) Oral once PRN Blood Glucose LESS THAN 70 milliGRAM(s)/deciliter  melatonin 3 milliGRAM(s) Oral at bedtime PRN Insomnia    Allergies    penicillin (Unknown)    Intolerances      T(C): 37 (12-21-22 @ 10:15), Max: 37 (12-21-22 @ 10:15)  HR: 84 (12-21-22 @ 10:15) (74 - 101)  BP: 125/84 (12-21-22 @ 10:15) (108/74 - 125/84)  BP(mean): --  RR: 20 (12-21-22 @ 10:15) (17 - 20)  SpO2: 100% (12-21-22 @ 10:15) (91% - 100%)  Wt(kg): --    GENERAL: NAD , no increased WOB, speaking in full sentences , nontoxic appearing   HEAD:  Atraumatic, Normocephalic  EYES: EOMI, PERRLA, conjunctiva and sclera clear  ENMT: No tonsillar erythema, exudates, or enlargement; Moist mucous membranes   NECK: Supple, No JVD   NERVOUS SYSTEM:  Alert & Oriented X3, Good concentration; nonfocal   CHEST/LUNG: CTAB;  No rales, rhonchi, wheezing, or rubs  HEART: Regular rate and rhythm; No murmurs, rubs, or gallops  ABDOMEN: Soft, Nontender, Nondistended; Bowel sounds present  EXTREMITIES:  2+ Peripheral Pulses b/l, No clubbing, cyanosis, or edema b/l         Labs    12-22    139  |  102  |  25<H>  ----------------------------<  108<H>  4.4   |  31  |  0.72    Ca    8.9      22 Dec 2022 07:50    TPro  7.4  /  Alb  2.4<L>  /  TBili  0.3  /  DBili  <0.05  /  AST  12<L>  /  ALT  24  /  AlkPhos  59  12-22        Consultant(s) Notes Reveiwed [x ] Yes     Care Discussed with [ x] Consultants  [ x] Patient  [x ] Family --father over the phone  [phone], [phone]  [ x] /   [x ] Other; RN

## 2022-12-22 NOTE — PROGRESS NOTE ADULT - ASSESSMENT
52M PMH obesity, asthma, ?PRINCE, preDM presents for SOB, cough, fever Reports  + COVID test day prior to admission. Found to be hypoxic by EMS to the 50s on RA. ABG 7.19/>112/155/44/98% on 4LNC. Placed on bipap. Admitted to medical floor for COVID    DX: acute on chronic hypercarbic respiratory failure, acute hypoxic respiratory failure, COVID PNA, acute metabolic encephalopathy, obesity      - pt clinically looks well, awake, alert, talking, making needs known  - "hungry"   - pt with underlying chronic respiratory acidosis and metabolic alkalosis  - he has chronic hypercarbia CO2 retention likely due to underlying PRINCE  - bicarb on admission elevated with worsening to the 50s during hospital course  - s/p diamox yesterday 12/21 for metabolic alkalosis  - pt has not been diuresed and is not on diuretics at home and denies diarrhea to explain for uptrending alkalosis. he likely has baseline elevated bicarb in response/compensation to his chronic hypercarbia  - ABG this morning with improved bicarb   - will hold on further diamox as acid base balance likely back to baseline  - cont with nocturnal BiPAP  - nasal cannula supplementation during the day  - wean down FIO2 as tolerates, now on 2L NC  - maintain O2 sat > 90%  - remdesivir/dexamethasone for treatement of COVID  - DVT ppx  - will need outpatient pulmonary follow up, PFTs, sleep study  - d/w father today over the phone

## 2022-12-22 NOTE — PROGRESS NOTE ADULT - ASSESSMENT
#Acute Hypoxic and Hypercapneic Respiratory Failure, Asthma   #  COVID-19   #Respiratory Acidosis with Metabolic Alkalosis   #Obesity Hypoventilation Syndrome    - Initial AB.19, CO2: 114, HCO3: 38  - () Bipap initiated,  @50% transitioned to 6 L NC.  - Continuous pulse ox, monitor  - Titrate O2>92%  - Decadron 6mg qD x 10 days  - Albuterol, tylenol, tessalon PRN  - BMI of 38, Bicarb of 38  - Evaluate pt with sleep study for sleep apnea outpatient  - () Start remdesivir. C/W bipap. Pulm consult   () C/W remdesivir.    given diomox x 1 for met alk    bicarb improved ; now on 2L NC and doing well   completed remdesivir   continue with decadron     # DM Type II  - Mod ISS  - Hga1c: 7.0    Code: Full  VTE: Lovenox  Diet: Regular    Problem/Plan - 1:  ·  Problem: Sepsis with acute hypoxic respiratory failure.     Problem/Plan - 2:  ·  Problem: Pneumonia due to COVID-19 virus.     Problem/Plan - 3:  ·  Problem: Obesity hypoventilation syndrome.     Problem/Plan - 4:  ·  Problem: Acute respiratory failure with hypoxia and hypercapnia.     Problem/Plan - 5:  ·  Problem: Acute on chronic respiratory acidosis.

## 2022-12-23 LAB
ALBUMIN SERPL ELPH-MCNC: 2.6 G/DL — LOW (ref 3.3–5)
ALP SERPL-CCNC: 59 U/L — SIGNIFICANT CHANGE UP (ref 40–120)
ALT FLD-CCNC: 31 U/L — SIGNIFICANT CHANGE UP (ref 12–78)
AST SERPL-CCNC: 16 U/L — SIGNIFICANT CHANGE UP (ref 15–37)
BILIRUB DIRECT SERPL-MCNC: <0.05 — SIGNIFICANT CHANGE UP (ref 0–0.3)
BILIRUB INDIRECT FLD-MCNC: SIGNIFICANT CHANGE UP (ref 0.2–1)
BILIRUB SERPL-MCNC: 0.2 MG/DL — SIGNIFICANT CHANGE UP (ref 0.2–1.2)
CREAT SERPL-MCNC: 0.77 MG/DL — SIGNIFICANT CHANGE UP (ref 0.5–1.3)
EGFR: 108 ML/MIN/1.73M2 — SIGNIFICANT CHANGE UP
GLUCOSE BLDC GLUCOMTR-MCNC: 100 MG/DL — HIGH (ref 70–99)
GLUCOSE BLDC GLUCOMTR-MCNC: 108 MG/DL — HIGH (ref 70–99)
GLUCOSE BLDC GLUCOMTR-MCNC: 122 MG/DL — HIGH (ref 70–99)
GLUCOSE BLDC GLUCOMTR-MCNC: 263 MG/DL — HIGH (ref 70–99)
MRSA PCR RESULT.: SIGNIFICANT CHANGE UP
PROT SERPL-MCNC: 7.4 GM/DL — SIGNIFICANT CHANGE UP (ref 6–8.3)
S AUREUS DNA NOSE QL NAA+PROBE: SIGNIFICANT CHANGE UP

## 2022-12-23 PROCEDURE — 99233 SBSQ HOSP IP/OBS HIGH 50: CPT

## 2022-12-23 PROCEDURE — 99232 SBSQ HOSP IP/OBS MODERATE 35: CPT

## 2022-12-23 RX ADMIN — Medication 6 MILLIGRAM(S): at 05:43

## 2022-12-23 RX ADMIN — ENOXAPARIN SODIUM 40 MILLIGRAM(S): 100 INJECTION SUBCUTANEOUS at 11:53

## 2022-12-23 RX ADMIN — ENOXAPARIN SODIUM 40 MILLIGRAM(S): 100 INJECTION SUBCUTANEOUS at 21:40

## 2022-12-23 RX ADMIN — REMDESIVIR 200 MILLIGRAM(S): 5 INJECTION INTRAVENOUS at 00:32

## 2022-12-23 RX ADMIN — TIOTROPIUM BROMIDE 2 PUFF(S): 18 CAPSULE ORAL; RESPIRATORY (INHALATION) at 16:57

## 2022-12-23 RX ADMIN — Medication 6: at 11:53

## 2022-12-23 NOTE — PROGRESS NOTE ADULT - SUBJECTIVE AND OBJECTIVE BOX
Patient is a 52y old  Male who presents with a chief complaint of COVID-19 pneumonia (19 Dec 2022 08:46)    INTERVAL HPI/OVERNIGHT EVENTS:   Patient seen and examined bedside.   no overnight events   now on 2 L NC and doing well,   bicarb improved after Diamox     REVIEW OF SYSTEMS: remaining ROS negative     MEDICATIONS  (STANDING):  dexAMETHasone     Tablet 6 milliGRAM(s) Oral daily  dextrose 5%. 1000 milliLiter(s) (100 mL/Hr) IV Continuous <Continuous>  dextrose 5%. 1000 milliLiter(s) (50 mL/Hr) IV Continuous <Continuous>  dextrose 50% Injectable 25 Gram(s) IV Push once  dextrose 50% Injectable 12.5 Gram(s) IV Push once  dextrose 50% Injectable 25 Gram(s) IV Push once  enoxaparin Injectable 40 milliGRAM(s) SubCutaneous every 12 hours  glucagon  Injectable 1 milliGRAM(s) IntraMuscular once  insulin lispro (ADMELOG) corrective regimen sliding scale   SubCutaneous three times a day before meals  remdesivir  IVPB   IV Intermittent   remdesivir  IVPB 100 milliGRAM(s) IV Intermittent every 24 hours    MEDICATIONS  (PRN):  acetaminophen     Tablet .. 650 milliGRAM(s) Oral every 6 hours PRN Temp greater or equal to 38C (100.4F), Mild Pain (1 - 3)  albuterol    90 MICROgram(s) HFA Inhaler 2 Puff(s) Inhalation every 6 hours PRN Shortness of Breath  benzonatate 100 milliGRAM(s) Oral every 8 hours PRN Cough  dextrose Oral Gel 15 Gram(s) Oral once PRN Blood Glucose LESS THAN 70 milliGRAM(s)/deciliter  melatonin 3 milliGRAM(s) Oral at bedtime PRN Insomnia    Allergies    penicillin (Unknown)    Intolerances      T(C): 37 (12-21-22 @ 10:15), Max: 37 (12-21-22 @ 10:15)  HR: 84 (12-21-22 @ 10:15) (74 - 101)  BP: 125/84 (12-21-22 @ 10:15) (108/74 - 125/84)  BP(mean): --  RR: 20 (12-21-22 @ 10:15) (17 - 20)  SpO2: 100% (12-21-22 @ 10:15) (91% - 100%)  Wt(kg): --          Labs    12-22    139  |  102  |  25<H>  ----------------------------<  108<H>  4.4   |  31  |  0.72    Ca    8.9      22 Dec 2022 07:50    TPro  7.4  /  Alb  2.4<L>  /  TBili  0.3  /  DBili  <0.05  /  AST  12<L>  /  ALT  24  /  AlkPhos  59  12-22        Consultant(s) Notes Reveiwed [x ] Yes     Care Discussed with [ x] Consultants  [ x] Patient  [x ] Family --father over the phone  [phone], [phone]  [ x] /   [x ] Other; RN Patient is a 52y old  Male who presents with a chief complaint of COVID-19 pneumonia (19 Dec 2022 08:46)    INTERVAL HPI/OVERNIGHT EVENTS:   Patient seen and examined bedside.   no overnight events   now on 2 L NC and doing well,   bicarb improved after Diamox     REVIEW OF SYSTEMS: remaining ROS negative     MEDICATIONS  (STANDING):  dexAMETHasone     Tablet 6 milliGRAM(s) Oral daily  dextrose 5%. 1000 milliLiter(s) (100 mL/Hr) IV Continuous <Continuous>  dextrose 5%. 1000 milliLiter(s) (50 mL/Hr) IV Continuous <Continuous>  dextrose 50% Injectable 25 Gram(s) IV Push once  dextrose 50% Injectable 12.5 Gram(s) IV Push once  dextrose 50% Injectable 25 Gram(s) IV Push once  enoxaparin Injectable 40 milliGRAM(s) SubCutaneous every 12 hours  glucagon  Injectable 1 milliGRAM(s) IntraMuscular once  insulin lispro (ADMELOG) corrective regimen sliding scale   SubCutaneous three times a day before meals  remdesivir  IVPB   IV Intermittent   remdesivir  IVPB 100 milliGRAM(s) IV Intermittent every 24 hours    MEDICATIONS  (PRN):  acetaminophen     Tablet .. 650 milliGRAM(s) Oral every 6 hours PRN Temp greater or equal to 38C (100.4F), Mild Pain (1 - 3)  albuterol    90 MICROgram(s) HFA Inhaler 2 Puff(s) Inhalation every 6 hours PRN Shortness of Breath  benzonatate 100 milliGRAM(s) Oral every 8 hours PRN Cough  dextrose Oral Gel 15 Gram(s) Oral once PRN Blood Glucose LESS THAN 70 milliGRAM(s)/deciliter  melatonin 3 milliGRAM(s) Oral at bedtime PRN Insomnia    Allergies    penicillin (Unknown)    Intolerances      T(C): 37 (12-21-22 @ 10:15), Max: 37 (12-21-22 @ 10:15)  HR: 84 (12-21-22 @ 10:15) (74 - 101)  BP: 125/84 (12-21-22 @ 10:15) (108/74 - 125/84)  BP(mean): --  RR: 20 (12-21-22 @ 10:15) (17 - 20)  SpO2: 100% (12-21-22 @ 10:15) (91% - 100%)  Wt(kg): --    GENERAL: NAD , no increased WOB, speaking in full sentences , nontoxic appearing   HEAD:  Atraumatic, Normocephalic  EYES: EOMI, PERRLA, conjunctiva and sclera clear  ENMT: No tonsillar erythema, exudates, or enlargement; Moist mucous membranes   NECK: Supple, No JVD   NERVOUS SYSTEM:  Alert & Oriented X3, Good concentration; nonfocal   CHEST/LUNG: CTAB;  No rales, rhonchi, wheezing, or rubs  HEART: Regular rate and rhythm; No murmurs, rubs, or gallops  ABDOMEN: Soft, Nontender, Nondistended; Bowel sounds present  EXTREMITIES:  2+ Peripheral Pulses b/l, No clubbing, cyanosis, or edema b/l         Labs    12-22    139  |  102  |  25<H>  ----------------------------<  108<H>  4.4   |  31  |  0.72    Ca    8.9      22 Dec 2022 07:50    TPro  7.4  /  Alb  2.4<L>  /  TBili  0.3  /  DBili  <0.05  /  AST  12<L>  /  ALT  24  /  AlkPhos  59  12-22        Consultant(s) Notes Reveiwed [x ] Yes     Care Discussed with [ x] Consultants  [ x] Patient  [x ] Family --father over the phone  [phone], [phone]  [ x] /   [x ] Other; RN

## 2022-12-23 NOTE — PROGRESS NOTE ADULT - ASSESSMENT
52M PMH obesity, asthma, ?PRINCE, preDM presents for SOB, cough, fever Reports  + COVID test day prior to admission. Found to be hypoxic by EMS to the 50s on RA. ABG 7.19/>112/155/44/98% on 4LNC. Placed on bipap. Admitted to medical floor for COVID    DX: acute on chronic hypercarbic respiratory failure, acute hypoxic respiratory failure, COVID PNA, acute metabolic encephalopathy, obesity      - pt clinically looks well, awake, alert, talking  - pt with underlying chronic respiratory acidosis and metabolic alkalosis  - he has chronic hypercarbia CO2 retention likely due to underlying PRINCE  - bicarb on admission elevated but with worsening to the 50s during hospital course  - s/p diamox 12/21 for metabolic alkalosis  - pt has not been diuresed and is not on diuretics at home and denies diarrhea to explain for uptrending alkalosis. he likely has baseline elevated bicarb in response/compensation to his chronic hypercarbia  - ABG yesterday with improved bicarb   - will hold on further diamox as acid base balance likely back to baseline  - cont with nocturnal BiPAP  - nasal cannula supplementation during the day  - wean down FIO2 as tolerates, now on 2L NC  - maintain O2 sat > 90%  - check O2 sat on RA  - completed 5 day course of remdesivir  - cont and complete 10 day course of dexamethasone for treatement of COVID  - DVT ppx  - will need outpatient pulmonary follow up, PFTs, sleep study  - check chemistries/ABG in next 24 hrs to evaluate bicarb status and acid base status  - d/w father today over the phone

## 2022-12-23 NOTE — PROGRESS NOTE ADULT - SUBJECTIVE AND OBJECTIVE BOX
24 hr events:  no acute events  feels well  on nasal cannula 2L  denies SOB, cough  s/p Diamox 12/21 with improved alkalosis on blood gas       ROS  feels well  no complaints  no HA, no dizziness  no fever, no chills  no sore throat, no runny nose  no visual disturbances, no auditory changes  no SOB  no cough  no CP, no palpitations  no abdominal pain, no N/V/D  no dysuria  no muscle aches  no swelling      MEDICATIONS  (STANDING):  dexAMETHasone     Tablet 6 milliGRAM(s) Oral daily  dextrose 5%. 1000 milliLiter(s) (100 mL/Hr) IV Continuous <Continuous>  dextrose 5%. 1000 milliLiter(s) (50 mL/Hr) IV Continuous <Continuous>  dextrose 50% Injectable 25 Gram(s) IV Push once  dextrose 50% Injectable 12.5 Gram(s) IV Push once  dextrose 50% Injectable 25 Gram(s) IV Push once  enoxaparin Injectable 40 milliGRAM(s) SubCutaneous every 12 hours  glucagon  Injectable 1 milliGRAM(s) IntraMuscular once  insulin lispro (ADMELOG) corrective regimen sliding scale   SubCutaneous three times a day before meals  tiotropium 2.5 MICROgram(s) Inhaler 2 Puff(s) Inhalation daily    MEDICATIONS  (PRN):  acetaminophen     Tablet .. 650 milliGRAM(s) Oral every 6 hours PRN Temp greater or equal to 38C (100.4F), Mild Pain (1 - 3)  albuterol    90 MICROgram(s) HFA Inhaler 2 Puff(s) Inhalation every 6 hours PRN Shortness of Breath  benzonatate 100 milliGRAM(s) Oral every 8 hours PRN Cough  dextrose Oral Gel 15 Gram(s) Oral once PRN Blood Glucose LESS THAN 70 milliGRAM(s)/deciliter  melatonin 3 milliGRAM(s) Oral at bedtime PRN Insomnia    LABS  Hepatic Function Panel (12.23.22 @ 06:44)    Indirect Reacting Bilirubin: Unable to calculate    Protein Total, Serum: 7.4 gm/dL    Albumin, Serum: 2.6 g/dL    Bilirubin Total, Serum: 0.2 mg/dL    Bilirubin Direct, Serum: <0.05    Alkaline Phosphatase, Serum: 59 U/L    Aspartate Aminotransferase (AST/SGOT): 16 U/L    Alanine Aminotransferase (ALT/SGPT): 31 U/L    ABG - ( 22 Dec 2022 06:19 )  pH, Arterial: 7.38  /  pCO2: 68    /  pO2: 64    / HCO3: 40    / Base Excess: 12.0  /  SaO2: 92.8        Vital Signs   T(F): 99.4   HR: 87   BP: 108/64  RR: 19    SpO2: 98%    Patient On (Oxygen Delivery Method): nasal cannula  O2 Flow (L/min): 2      EXAM:  GEN: obese male, NAD  HEENT: NC/AT, short neck, EOMI, sclera white  CV: RRR  PULM: no wheeze, no rales ,no rhonchi, CTA bilaterally  ABD: obese, NT, + BS  EXT: no edema/no cyanosis, no clubbing  NEURO: awake, alert, speaking full sentences, moving all extremities no focal deificts

## 2022-12-24 LAB
ANION GAP SERPL CALC-SCNC: 4 MMOL/L — LOW (ref 5–17)
BASE EXCESS BLDA CALC-SCNC: 11.2 MMOL/L — HIGH (ref -2–3)
BLOOD GAS COMMENTS ARTERIAL: SIGNIFICANT CHANGE UP
BUN SERPL-MCNC: 23 MG/DL — SIGNIFICANT CHANGE UP (ref 7–23)
CALCIUM SERPL-MCNC: 8.4 MG/DL — LOW (ref 8.5–10.1)
CHLORIDE SERPL-SCNC: 101 MMOL/L — SIGNIFICANT CHANGE UP (ref 96–108)
CO2 BLDA-SCNC: 40 MMOL/L — HIGH (ref 19–24)
CO2 SERPL-SCNC: 34 MMOL/L — HIGH (ref 22–31)
CREAT SERPL-MCNC: 0.76 MG/DL — SIGNIFICANT CHANGE UP (ref 0.5–1.3)
EGFR: 108 ML/MIN/1.73M2 — SIGNIFICANT CHANGE UP
GAS PNL BLDA: SIGNIFICANT CHANGE UP
GLUCOSE BLDC GLUCOMTR-MCNC: 103 MG/DL — HIGH (ref 70–99)
GLUCOSE BLDC GLUCOMTR-MCNC: 125 MG/DL — HIGH (ref 70–99)
GLUCOSE BLDC GLUCOMTR-MCNC: 130 MG/DL — HIGH (ref 70–99)
GLUCOSE BLDC GLUCOMTR-MCNC: 221 MG/DL — HIGH (ref 70–99)
GLUCOSE SERPL-MCNC: 174 MG/DL — HIGH (ref 70–99)
HCO3 BLDA-SCNC: 38 MMOL/L — HIGH (ref 21–28)
HOROWITZ INDEX BLDA+IHG-RTO: 28 — SIGNIFICANT CHANGE UP
PCO2 BLDA: 57 MMHG — HIGH (ref 32–46)
PH BLDA: 7.43 — SIGNIFICANT CHANGE UP (ref 7.35–7.45)
PO2 BLDA: 91 MMHG — SIGNIFICANT CHANGE UP (ref 83–108)
POTASSIUM SERPL-MCNC: 4.4 MMOL/L — SIGNIFICANT CHANGE UP (ref 3.5–5.3)
POTASSIUM SERPL-SCNC: 4.4 MMOL/L — SIGNIFICANT CHANGE UP (ref 3.5–5.3)
SAO2 % BLDA: 97.8 % — SIGNIFICANT CHANGE UP (ref 94–98)
SODIUM SERPL-SCNC: 139 MMOL/L — SIGNIFICANT CHANGE UP (ref 135–145)

## 2022-12-24 PROCEDURE — 99232 SBSQ HOSP IP/OBS MODERATE 35: CPT

## 2022-12-24 RX ADMIN — ENOXAPARIN SODIUM 40 MILLIGRAM(S): 100 INJECTION SUBCUTANEOUS at 10:58

## 2022-12-24 RX ADMIN — TIOTROPIUM BROMIDE 2 PUFF(S): 18 CAPSULE ORAL; RESPIRATORY (INHALATION) at 12:30

## 2022-12-24 RX ADMIN — ENOXAPARIN SODIUM 40 MILLIGRAM(S): 100 INJECTION SUBCUTANEOUS at 21:22

## 2022-12-24 RX ADMIN — Medication 6 MILLIGRAM(S): at 06:20

## 2022-12-24 RX ADMIN — Medication 4: at 10:59

## 2022-12-24 NOTE — DIETITIAN INITIAL EVALUATION ADULT - OTHER CALCULATIONS
Ht (cm): 172.7  Wt (kg): 113.4  BMI: 38.0    IBW: 69.8 kg              %IBW: 162%                 UBW: 113.4 kg             %UBW: 100%

## 2022-12-24 NOTE — PROGRESS NOTE ADULT - ASSESSMENT
52M PMH obesity, asthma, ?PRINCE, preDM presents for SOB, cough, fever Reports  + COVID test day prior to admission. Found to be hypoxic by EMS to the 50s on RA. ABG 7.19/>112/155/44/98% on 4LNC. Placed on bipap. Admitted to medical floor for COVID    Assessment and Plan:     #Acute Hypoxic Respiratory Failure   # acute on chronic hypercarbic respiratory failure  #  COVID-19   #Acute asthma exacerbation   # underlying chronic respiratory acidosis and metabolic alkalosis  #Obesity Hypoventilation Syndrome    - Initial AB.19, CO2: 114, HCO3: 38  CXR showed diffuse bilateral airspace infiltrates.  legionella, strep neg   - () Bipap initiated,  @50% transitioned to 6 L NC.  - Continuous pulse ox, monitor  - Titrate O2>92%  - () Start remdesivir. C/W bipap. Pulm consulted   () C/W remdesivir.    given diomox x 1 for met alk    bicarb improved ; now on 2L NC and doing well    will repeat ABG and BMP today   --completed remdesivir 5 day course   --continue with decadron to complete 10 days   - Albuterol HFA prn,   spiriva daily   - will need outpatient pulmonary follow up, PFTs, sleep study  awaiting home O2     acute metabolic encephalopathy POA, now resolved   patient at baseline MS     # DM Type II  - Hga1c: 7.0  continue with ISS   FS goal 140-180     Preventative measures   lovenox SQ-dvt ppx  patient ambulatory ; general precautions

## 2022-12-24 NOTE — DIETITIAN INITIAL EVALUATION ADULT - PERTINENT LABORATORY DATA
12-24    139  |  101  |  23  ----------------------------<  174<H>  4.4   |  34<H>  |  0.76    Ca    8.4<L>      24 Dec 2022 14:25    TPro  7.4  /  Alb  2.6<L>  /  TBili  0.2  /  DBili  <0.05  /  AST  16  /  ALT  31  /  AlkPhos  59  12-23  POCT Blood Glucose.: 221 mg/dL (12-24-22 @ 10:55)  A1C with Estimated Average Glucose Result: 7.0 % (12-18-22 @ 05:30)

## 2022-12-24 NOTE — DIETITIAN INITIAL EVALUATION ADULT - ADD RECOMMEND
1. Provide [facility] diet education upon removal of COVID isolation precautions.  2. Recommend continuing current diet as ordered.  3. Monitor PO intake, diet tolerance, glucose and weight.

## 2022-12-24 NOTE — DIETITIAN INITIAL EVALUATION ADULT - OTHER INFO
Pt with PMHx of obesity, asthma and preDM adm with acute hypoxic and hypercapnic respiratory failure and PNA secondary to +COVID (noted to be A&Ox2 upon initial assessment). Limited subjective assessment, extensive chart review completed. Unable to visit Pt due to COVID isolation protocol; unable to reach Pt via phone in room. As per chart review, Pt is disabled and has visiting nurse at home; father is primary caretaker. Spoke with covering RN, Pt with good appetite/intake during meals, tolerating current diet well. RN denies any GI distress or reported issues chewing/swallowing. Pt with hx of steroid induced preDM, on metformin PTA. HbA1c of 7.0% shows diabetic glycemic range, overall controlled at this time. Finger sticks generally within target range with some elevated readings > 200. On decadron and receiving admelog sliding scale; adjust as needed before meals. Consider bolus insulin as necessary. Unable to determine weight hx.

## 2022-12-24 NOTE — PROGRESS NOTE ADULT - TIME BILLING
min spent reviewing chart, examining patient, discussing plan with patient and family and staff, writing progress note and placing orders.
min spent reviewing chart, examining patient, discussing plan with patient and family and staff, writing progress note and placing orders.
direct patient care, review of charts, medication, blood work, imaging, vitals, disucssion with patient, father, hospitalist
min spent reviewing chart, examining patient, discussing plan with patient and family and staff, writing progress note and placing orders.

## 2022-12-24 NOTE — DIETITIAN INITIAL EVALUATION ADULT - PERTINENT MEDS FT
MEDICATIONS  (STANDING):  dexAMETHasone     Tablet 6 milliGRAM(s) Oral daily  dextrose 5%. 1000 milliLiter(s) (100 mL/Hr) IV Continuous <Continuous>  dextrose 5%. 1000 milliLiter(s) (50 mL/Hr) IV Continuous <Continuous>  dextrose 50% Injectable 25 Gram(s) IV Push once  dextrose 50% Injectable 12.5 Gram(s) IV Push once  dextrose 50% Injectable 25 Gram(s) IV Push once  enoxaparin Injectable 40 milliGRAM(s) SubCutaneous every 12 hours  glucagon  Injectable 1 milliGRAM(s) IntraMuscular once  insulin lispro (ADMELOG) corrective regimen sliding scale   SubCutaneous three times a day before meals  tiotropium 2.5 MICROgram(s) Inhaler 2 Puff(s) Inhalation daily    MEDICATIONS  (PRN):  acetaminophen     Tablet .. 650 milliGRAM(s) Oral every 6 hours PRN Temp greater or equal to 38C (100.4F), Mild Pain (1 - 3)  albuterol    90 MICROgram(s) HFA Inhaler 2 Puff(s) Inhalation every 6 hours PRN Shortness of Breath  benzonatate 100 milliGRAM(s) Oral every 8 hours PRN Cough  dextrose Oral Gel 15 Gram(s) Oral once PRN Blood Glucose LESS THAN 70 milliGRAM(s)/deciliter  melatonin 3 milliGRAM(s) Oral at bedtime PRN Insomnia

## 2022-12-24 NOTE — PROGRESS NOTE ADULT - SUBJECTIVE AND OBJECTIVE BOX
Patient is a 52y old  Male who presents with a chief complaint of COVID-19 pneumonia (19 Dec 2022 08:46)    INTERVAL HPI/OVERNIGHT EVENTS:   Patient seen and examined bedside.   no overnight events   no complaints     REVIEW OF SYSTEMS: remaining ROS negative     MEDICATIONS  (STANDING):  dexAMETHasone     Tablet 6 milliGRAM(s) Oral daily  dextrose 5%. 1000 milliLiter(s) (100 mL/Hr) IV Continuous <Continuous>  dextrose 5%. 1000 milliLiter(s) (50 mL/Hr) IV Continuous <Continuous>  dextrose 50% Injectable 25 Gram(s) IV Push once  dextrose 50% Injectable 12.5 Gram(s) IV Push once  dextrose 50% Injectable 25 Gram(s) IV Push once  enoxaparin Injectable 40 milliGRAM(s) SubCutaneous every 12 hours  glucagon  Injectable 1 milliGRAM(s) IntraMuscular once  insulin lispro (ADMELOG) corrective regimen sliding scale   SubCutaneous three times a day before meals  remdesivir  IVPB   IV Intermittent   remdesivir  IVPB 100 milliGRAM(s) IV Intermittent every 24 hours    MEDICATIONS  (PRN):  acetaminophen     Tablet .. 650 milliGRAM(s) Oral every 6 hours PRN Temp greater or equal to 38C (100.4F), Mild Pain (1 - 3)  albuterol    90 MICROgram(s) HFA Inhaler 2 Puff(s) Inhalation every 6 hours PRN Shortness of Breath  benzonatate 100 milliGRAM(s) Oral every 8 hours PRN Cough  dextrose Oral Gel 15 Gram(s) Oral once PRN Blood Glucose LESS THAN 70 milliGRAM(s)/deciliter  melatonin 3 milliGRAM(s) Oral at bedtime PRN Insomnia    Allergies    penicillin (Unknown)    Intolerances    Vital Signs Last 24 Hrs  T(C): 36.6 (24 Dec 2022 10:54), Max: 37.4 (23 Dec 2022 16:02)  T(F): 97.8 (24 Dec 2022 10:54), Max: 99.4 (23 Dec 2022 16:02)  HR: 99 (24 Dec 2022 10:54) (78 - 99)  BP: 112/75 (24 Dec 2022 10:54) (100/66 - 112/75)  BP(mean): --  RR: 18 (24 Dec 2022 10:54) (18 - 19)  SpO2: 92% (24 Dec 2022 10:54) (92% - 98%)    Parameters below as of 24 Dec 2022 10:54  Patient On (Oxygen Delivery Method): room air        Labs    no new labs     Consultant(s) Notes Reveiwed [x ] Yes     Care Discussed with [ x] Consultants  [ x] Patient  [x ] Family --father over the phone  [phone], [phone]  [ x] /   [x ] Other; RN Patient is a 52y old  Male who presents with a chief complaint of COVID-19 pneumonia (19 Dec 2022 08:46)    INTERVAL HPI/OVERNIGHT EVENTS:   Patient seen and examined bedside.   no overnight events   no complaints     REVIEW OF SYSTEMS: remaining ROS negative     MEDICATIONS  (STANDING):  dexAMETHasone     Tablet 6 milliGRAM(s) Oral daily  dextrose 5%. 1000 milliLiter(s) (100 mL/Hr) IV Continuous <Continuous>  dextrose 5%. 1000 milliLiter(s) (50 mL/Hr) IV Continuous <Continuous>  dextrose 50% Injectable 25 Gram(s) IV Push once  dextrose 50% Injectable 12.5 Gram(s) IV Push once  dextrose 50% Injectable 25 Gram(s) IV Push once  enoxaparin Injectable 40 milliGRAM(s) SubCutaneous every 12 hours  glucagon  Injectable 1 milliGRAM(s) IntraMuscular once  insulin lispro (ADMELOG) corrective regimen sliding scale   SubCutaneous three times a day before meals  remdesivir  IVPB   IV Intermittent   remdesivir  IVPB 100 milliGRAM(s) IV Intermittent every 24 hours    MEDICATIONS  (PRN):  acetaminophen     Tablet .. 650 milliGRAM(s) Oral every 6 hours PRN Temp greater or equal to 38C (100.4F), Mild Pain (1 - 3)  albuterol    90 MICROgram(s) HFA Inhaler 2 Puff(s) Inhalation every 6 hours PRN Shortness of Breath  benzonatate 100 milliGRAM(s) Oral every 8 hours PRN Cough  dextrose Oral Gel 15 Gram(s) Oral once PRN Blood Glucose LESS THAN 70 milliGRAM(s)/deciliter  melatonin 3 milliGRAM(s) Oral at bedtime PRN Insomnia    Allergies    penicillin (Unknown)    Intolerances    Vital Signs Last 24 Hrs  T(C): 36.6 (24 Dec 2022 10:54), Max: 37.4 (23 Dec 2022 16:02)  T(F): 97.8 (24 Dec 2022 10:54), Max: 99.4 (23 Dec 2022 16:02)  HR: 99 (24 Dec 2022 10:54) (78 - 99)  BP: 112/75 (24 Dec 2022 10:54) (100/66 - 112/75)  BP(mean): --  RR: 18 (24 Dec 2022 10:54) (18 - 19)  SpO2: 92% (24 Dec 2022 10:54) (92% - 98%)    Parameters below as of 24 Dec 2022 10:54  Patient On (Oxygen Delivery Method): room air    GENERAL: NAD , no increased WOB, speaking in full sentences , nontoxic appearing   HEAD:  Atraumatic, Normocephalic  EYES: EOMI, PERRLA, conjunctiva and sclera clear  ENMT: No tonsillar erythema, exudates, or enlargement; Moist mucous membranes   NECK: Supple, No JVD   NERVOUS SYSTEM:  Alert & Oriented X3, Good concentration; nonfocal   CHEST/LUNG: CTAB;  No rales, rhonchi, wheezing, or rubs  HEART: Regular rate and rhythm; No murmurs, rubs, or gallops  ABDOMEN: Soft, Nontender, Nondistended; Bowel sounds present  EXTREMITIES:  2+ Peripheral Pulses b/l, No clubbing, cyanosis, or edema b/l       Labs    no new labs     Consultant(s) Notes Reveiwed [x ] Yes     Care Discussed with [ x] Consultants  [ x] Patient  [x ] Family --father over the phone  [phone], [phone]  [ x] /   [x ] Other; RN

## 2022-12-25 LAB
GLUCOSE BLDC GLUCOMTR-MCNC: 103 MG/DL — HIGH (ref 70–99)
GLUCOSE BLDC GLUCOMTR-MCNC: 118 MG/DL — HIGH (ref 70–99)
GLUCOSE BLDC GLUCOMTR-MCNC: 175 MG/DL — HIGH (ref 70–99)
GLUCOSE BLDC GLUCOMTR-MCNC: 196 MG/DL — HIGH (ref 70–99)

## 2022-12-25 PROCEDURE — 99232 SBSQ HOSP IP/OBS MODERATE 35: CPT

## 2022-12-25 RX ADMIN — ENOXAPARIN SODIUM 40 MILLIGRAM(S): 100 INJECTION SUBCUTANEOUS at 22:37

## 2022-12-25 RX ADMIN — TIOTROPIUM BROMIDE 2 PUFF(S): 18 CAPSULE ORAL; RESPIRATORY (INHALATION) at 11:56

## 2022-12-25 RX ADMIN — Medication 2: at 11:53

## 2022-12-25 RX ADMIN — ENOXAPARIN SODIUM 40 MILLIGRAM(S): 100 INJECTION SUBCUTANEOUS at 11:53

## 2022-12-25 RX ADMIN — Medication 6 MILLIGRAM(S): at 05:19

## 2022-12-25 NOTE — PROGRESS NOTE ADULT - ASSESSMENT
52M PMH obesity, asthma, ?PRINCE, preDM presents for SOB, cough, fever Reports  + COVID test day prior to admission. Found to be hypoxic by EMS to the 50s on RA. ABG 7.19/>112/155/44/98% on 4LNC. Placed on bipap. Admitted to medical floor for COVID    Assessment and Plan:     #Acute Hypoxic Respiratory Failure   # acute on chronic hypercarbic respiratory failure  #  COVID-19   #Acute asthma exacerbation   # underlying chronic respiratory acidosis and metabolic alkalosis  #Obesity Hypoventilation Syndrome    - Initial AB.19, CO2: 114, HCO3: 38  CXR showed diffuse bilateral airspace infiltrates.  legionella, strep neg   - () Bipap initiated,  @50% transitioned to 6 L NC.  - Continuous pulse ox, monitor  - Titrate O2>92%  - () Start remdesivir. C/W bipap. Pulm consulted   () C/W remdesivir.    given diomox x 1 for met alk    bicarb improved ; now on 2L NC and doing well    will repeat ABG and BMP today appreciated and improved     --completed remdesivir 5 day course   --continue with decadron to complete 10 days   - Albuterol HFA prn,   spiriva daily   - will need outpatient pulmonary follow up, PFTs, sleep study  awaiting home O2     acute metabolic encephalopathy POA, now resolved   patient at baseline MS     # DM Type II  - Hga1c: 7.0  continue with ISS   FS goal 140-180     Preventative measures   lovenox SQ-dvt ppx  patient ambulatory ; general precautions

## 2022-12-25 NOTE — PROGRESS NOTE ADULT - SUBJECTIVE AND OBJECTIVE BOX
Patient is a 52y old  Male who presents with a chief complaint of COVID-19 pneumonia (19 Dec 2022 08:46)    INTERVAL HPI/OVERNIGHT EVENTS:   Patient seen and examined bedside.   no overnight events   no complaints     REVIEW OF SYSTEMS: remaining ROS negative     MEDICATIONS  (STANDING):  dexAMETHasone     Tablet 6 milliGRAM(s) Oral daily  dextrose 5%. 1000 milliLiter(s) (100 mL/Hr) IV Continuous <Continuous>  dextrose 5%. 1000 milliLiter(s) (50 mL/Hr) IV Continuous <Continuous>  dextrose 50% Injectable 25 Gram(s) IV Push once  dextrose 50% Injectable 12.5 Gram(s) IV Push once  dextrose 50% Injectable 25 Gram(s) IV Push once  enoxaparin Injectable 40 milliGRAM(s) SubCutaneous every 12 hours  glucagon  Injectable 1 milliGRAM(s) IntraMuscular once  insulin lispro (ADMELOG) corrective regimen sliding scale   SubCutaneous three times a day before meals  remdesivir  IVPB   IV Intermittent   remdesivir  IVPB 100 milliGRAM(s) IV Intermittent every 24 hours    MEDICATIONS  (PRN):  acetaminophen     Tablet .. 650 milliGRAM(s) Oral every 6 hours PRN Temp greater or equal to 38C (100.4F), Mild Pain (1 - 3)  albuterol    90 MICROgram(s) HFA Inhaler 2 Puff(s) Inhalation every 6 hours PRN Shortness of Breath  benzonatate 100 milliGRAM(s) Oral every 8 hours PRN Cough  dextrose Oral Gel 15 Gram(s) Oral once PRN Blood Glucose LESS THAN 70 milliGRAM(s)/deciliter  melatonin 3 milliGRAM(s) Oral at bedtime PRN Insomnia    ABG - ( 24 Dec 2022 14:32 )  pH, Arterial: 7.43  pH, Blood: x     /  pCO2: 57    /  pO2: 91    / HCO3: 38    / Base Excess: 11.2  /  SaO2: 97.8            Allergies    penicillin (Unknown)    Intolerances    Vital Signs Last 24 Hrs  T(C): 37.1 (25 Dec 2022 04:56), Max: 37.1 (25 Dec 2022 04:56)  T(F): 98.8 (25 Dec 2022 04:56), Max: 98.8 (25 Dec 2022 04:56)  HR: 83 (25 Dec 2022 04:56) (83 - 99)  BP: 98/64 (25 Dec 2022 04:56) (98/64 - 114/76)  BP(mean): --  RR: 18 (25 Dec 2022 04:56) (18 - 18)  SpO2: 98% (25 Dec 2022 04:56) (92% - 98%)    Parameters below as of 25 Dec 2022 04:56  Patient On (Oxygen Delivery Method): nasal cannula  O2 Flow (L/min): 2        Labs    12-24    139  |  101  |  23  ----------------------------<  174<H>  4.4   |  34<H>  |  0.76    Ca    8.4<L>      24 Dec 2022 14:25    ABG - ( 24 Dec 2022 14:32 )  pH, Arterial: 7.43  pH, Blood: x     /  pCO2: 57    /  pO2: 91    / HCO3: 38    / Base Excess: 11.2  /  SaO2: 97.8                Consultant(s) Notes Reveiwed [x ] Yes     Care Discussed with [ x] Consultants  [ x] Patient  [x ] Family --father over the phone  [phone], [phone]  [ x] /   [x ] Other; RN Patient is a 52y old  Male who presents with a chief complaint of COVID-19 pneumonia (19 Dec 2022 08:46)    INTERVAL HPI/OVERNIGHT EVENTS:   Patient seen and examined bedside.   no overnight events   no complaints     REVIEW OF SYSTEMS: remaining ROS negative     MEDICATIONS  (STANDING):  dexAMETHasone     Tablet 6 milliGRAM(s) Oral daily  dextrose 5%. 1000 milliLiter(s) (100 mL/Hr) IV Continuous <Continuous>  dextrose 5%. 1000 milliLiter(s) (50 mL/Hr) IV Continuous <Continuous>  dextrose 50% Injectable 25 Gram(s) IV Push once  dextrose 50% Injectable 12.5 Gram(s) IV Push once  dextrose 50% Injectable 25 Gram(s) IV Push once  enoxaparin Injectable 40 milliGRAM(s) SubCutaneous every 12 hours  glucagon  Injectable 1 milliGRAM(s) IntraMuscular once  insulin lispro (ADMELOG) corrective regimen sliding scale   SubCutaneous three times a day before meals  remdesivir  IVPB   IV Intermittent   remdesivir  IVPB 100 milliGRAM(s) IV Intermittent every 24 hours    MEDICATIONS  (PRN):  acetaminophen     Tablet .. 650 milliGRAM(s) Oral every 6 hours PRN Temp greater or equal to 38C (100.4F), Mild Pain (1 - 3)  albuterol    90 MICROgram(s) HFA Inhaler 2 Puff(s) Inhalation every 6 hours PRN Shortness of Breath  benzonatate 100 milliGRAM(s) Oral every 8 hours PRN Cough  dextrose Oral Gel 15 Gram(s) Oral once PRN Blood Glucose LESS THAN 70 milliGRAM(s)/deciliter  melatonin 3 milliGRAM(s) Oral at bedtime PRN Insomnia    ABG - ( 24 Dec 2022 14:32 )  pH, Arterial: 7.43  pH, Blood: x     /  pCO2: 57    /  pO2: 91    / HCO3: 38    / Base Excess: 11.2  /  SaO2: 97.8            Allergies    penicillin (Unknown)    Intolerances    Vital Signs Last 24 Hrs  T(C): 37.1 (25 Dec 2022 04:56), Max: 37.1 (25 Dec 2022 04:56)  T(F): 98.8 (25 Dec 2022 04:56), Max: 98.8 (25 Dec 2022 04:56)  HR: 83 (25 Dec 2022 04:56) (83 - 99)  BP: 98/64 (25 Dec 2022 04:56) (98/64 - 114/76)  BP(mean): --  RR: 18 (25 Dec 2022 04:56) (18 - 18)  SpO2: 98% (25 Dec 2022 04:56) (92% - 98%)    Parameters below as of 25 Dec 2022 04:56  Patient On (Oxygen Delivery Method): nasal cannula  O2 Flow (L/min): 2    GENERAL: NAD , no increased WOB, speaking in full sentences , nontoxic appearing   HEAD:  Atraumatic, Normocephalic  EYES: EOMI, PERRLA, conjunctiva and sclera clear  ENMT: No tonsillar erythema, exudates, or enlargement; Moist mucous membranes   NECK: Supple, No JVD   NERVOUS SYSTEM:  Alert & Oriented X3, Good concentration; nonfocal   CHEST/LUNG: CTAB;  No rales, rhonchi, wheezing, or rubs  HEART: Regular rate and rhythm; No murmurs, rubs, or gallops  ABDOMEN: Soft, Nontender, Nondistended; Bowel sounds present  EXTREMITIES:  2+ Peripheral Pulses b/l, No clubbing, cyanosis, or edema b/l         Labs    12-24    139  |  101  |  23  ----------------------------<  174<H>  4.4   |  34<H>  |  0.76    Ca    8.4<L>      24 Dec 2022 14:25    ABG - ( 24 Dec 2022 14:32 )  pH, Arterial: 7.43  pH, Blood: x     /  pCO2: 57    /  pO2: 91    / HCO3: 38    / Base Excess: 11.2  /  SaO2: 97.8                Consultant(s) Notes Reveiwed [x ] Yes     Care Discussed with [ x] Consultants  [ x] Patient  [x ] Family --father over the phone  [phone], [phone]  [ x] /   [x ] Other; RN

## 2022-12-26 LAB
GLUCOSE BLDC GLUCOMTR-MCNC: 123 MG/DL — HIGH (ref 70–99)
GLUCOSE BLDC GLUCOMTR-MCNC: 137 MG/DL — HIGH (ref 70–99)
GLUCOSE BLDC GLUCOMTR-MCNC: 208 MG/DL — HIGH (ref 70–99)
GLUCOSE BLDC GLUCOMTR-MCNC: 99 MG/DL — SIGNIFICANT CHANGE UP (ref 70–99)

## 2022-12-26 PROCEDURE — 99232 SBSQ HOSP IP/OBS MODERATE 35: CPT

## 2022-12-26 RX ADMIN — Medication 6 MILLIGRAM(S): at 06:09

## 2022-12-26 RX ADMIN — TIOTROPIUM BROMIDE 2 PUFF(S): 18 CAPSULE ORAL; RESPIRATORY (INHALATION) at 11:11

## 2022-12-26 RX ADMIN — Medication 4: at 11:11

## 2022-12-26 RX ADMIN — ENOXAPARIN SODIUM 40 MILLIGRAM(S): 100 INJECTION SUBCUTANEOUS at 21:40

## 2022-12-26 RX ADMIN — ENOXAPARIN SODIUM 40 MILLIGRAM(S): 100 INJECTION SUBCUTANEOUS at 11:10

## 2022-12-26 NOTE — PROGRESS NOTE ADULT - PROVIDER SPECIALTY LIST ADULT
Hospitalist
Pulmonology
Pulmonology
Hospitalist
Hospitalist
Pulmonology
Pulmonology
Hospitalist

## 2022-12-26 NOTE — PROGRESS NOTE ADULT - SUBJECTIVE AND OBJECTIVE BOX
Patient is a 52y old  Male who presents with a chief complaint of COVID-19 pneumonia (19 Dec 2022 08:46)    INTERVAL HPI/OVERNIGHT EVENTS:   Patient seen and examined bedside.   no overnight events   no complaints     REVIEW OF SYSTEMS: remaining ROS negative     MEDICATIONS  (STANDING):  dexAMETHasone     Tablet 6 milliGRAM(s) Oral daily  dextrose 5%. 1000 milliLiter(s) (100 mL/Hr) IV Continuous <Continuous>  dextrose 5%. 1000 milliLiter(s) (50 mL/Hr) IV Continuous <Continuous>  dextrose 50% Injectable 25 Gram(s) IV Push once  dextrose 50% Injectable 12.5 Gram(s) IV Push once  dextrose 50% Injectable 25 Gram(s) IV Push once  enoxaparin Injectable 40 milliGRAM(s) SubCutaneous every 12 hours  glucagon  Injectable 1 milliGRAM(s) IntraMuscular once  insulin lispro (ADMELOG) corrective regimen sliding scale   SubCutaneous three times a day before meals  remdesivir  IVPB   IV Intermittent   remdesivir  IVPB 100 milliGRAM(s) IV Intermittent every 24 hours    MEDICATIONS  (PRN):  acetaminophen     Tablet .. 650 milliGRAM(s) Oral every 6 hours PRN Temp greater or equal to 38C (100.4F), Mild Pain (1 - 3)  albuterol    90 MICROgram(s) HFA Inhaler 2 Puff(s) Inhalation every 6 hours PRN Shortness of Breath  benzonatate 100 milliGRAM(s) Oral every 8 hours PRN Cough  dextrose Oral Gel 15 Gram(s) Oral once PRN Blood Glucose LESS THAN 70 milliGRAM(s)/deciliter  melatonin 3 milliGRAM(s) Oral at bedtime PRN Insomnia    ABG - ( 24 Dec 2022 14:32 )  pH, Arterial: 7.43  pH, Blood: x     /  pCO2: 57    /  pO2: 91    / HCO3: 38    / Base Excess: 11.2  /  SaO2: 97.8            Allergies    penicillin (Unknown)    Intolerances    Vital Signs Last 24 Hrs  T(C): 36.7 (26 Dec 2022 16:34), Max: 36.9 (26 Dec 2022 00:33)  T(F): 98.1 (26 Dec 2022 16:34), Max: 98.5 (26 Dec 2022 00:33)  HR: 85 (26 Dec 2022 16:34) (79 - 97)  BP: 135/89 (26 Dec 2022 16:34) (99/62 - 135/89)  BP(mean): --  RR: 18 (26 Dec 2022 16:34) (18 - 19)  SpO2: 94% (26 Dec 2022 16:34) (93% - 94%)    Parameters below as of 26 Dec 2022 16:34  Patient On (Oxygen Delivery Method): nasal cannula  O2 Flow (L/min): 2          Labs    no new labs     Consultant(s) Notes Reviewed [x ] Yes     Care Discussed with [ x] Consultants  [ x] Patient  [x ] Family --father over the phone  [phone], [phone]  [ x] /   [x ] Other; RN Patient is a 52y old  Male who presents with a chief complaint of COVID-19 pneumonia (19 Dec 2022 08:46)    INTERVAL HPI/OVERNIGHT EVENTS:   Patient seen and examined bedside.   no overnight events   no complaints     REVIEW OF SYSTEMS: remaining ROS negative     MEDICATIONS  (STANDING):  dexAMETHasone     Tablet 6 milliGRAM(s) Oral daily  dextrose 5%. 1000 milliLiter(s) (100 mL/Hr) IV Continuous <Continuous>  dextrose 5%. 1000 milliLiter(s) (50 mL/Hr) IV Continuous <Continuous>  dextrose 50% Injectable 25 Gram(s) IV Push once  dextrose 50% Injectable 12.5 Gram(s) IV Push once  dextrose 50% Injectable 25 Gram(s) IV Push once  enoxaparin Injectable 40 milliGRAM(s) SubCutaneous every 12 hours  glucagon  Injectable 1 milliGRAM(s) IntraMuscular once  insulin lispro (ADMELOG) corrective regimen sliding scale   SubCutaneous three times a day before meals  remdesivir  IVPB   IV Intermittent   remdesivir  IVPB 100 milliGRAM(s) IV Intermittent every 24 hours    MEDICATIONS  (PRN):  acetaminophen     Tablet .. 650 milliGRAM(s) Oral every 6 hours PRN Temp greater or equal to 38C (100.4F), Mild Pain (1 - 3)  albuterol    90 MICROgram(s) HFA Inhaler 2 Puff(s) Inhalation every 6 hours PRN Shortness of Breath  benzonatate 100 milliGRAM(s) Oral every 8 hours PRN Cough  dextrose Oral Gel 15 Gram(s) Oral once PRN Blood Glucose LESS THAN 70 milliGRAM(s)/deciliter  melatonin 3 milliGRAM(s) Oral at bedtime PRN Insomnia    ABG - ( 24 Dec 2022 14:32 )  pH, Arterial: 7.43  pH, Blood: x     /  pCO2: 57    /  pO2: 91    / HCO3: 38    / Base Excess: 11.2  /  SaO2: 97.8            Allergies    penicillin (Unknown)    Intolerances    Vital Signs Last 24 Hrs  T(C): 36.7 (26 Dec 2022 16:34), Max: 36.9 (26 Dec 2022 00:33)  T(F): 98.1 (26 Dec 2022 16:34), Max: 98.5 (26 Dec 2022 00:33)  HR: 85 (26 Dec 2022 16:34) (79 - 97)  BP: 135/89 (26 Dec 2022 16:34) (99/62 - 135/89)  BP(mean): --  RR: 18 (26 Dec 2022 16:34) (18 - 19)  SpO2: 94% (26 Dec 2022 16:34) (93% - 94%)    Parameters below as of 26 Dec 2022 16:34  Patient On (Oxygen Delivery Method): nasal cannula  O2 Flow (L/min): 2      GENERAL: NAD , no increased WOB, speaking in full sentences , nontoxic appearing   HEAD:  Atraumatic, Normocephalic  EYES: EOMI, PERRLA, conjunctiva and sclera clear  ENMT: No tonsillar erythema, exudates, or enlargement; Moist mucous membranes   NECK: Supple, No JVD   NERVOUS SYSTEM:  Alert & Oriented X3, Good concentration; nonfocal   CHEST/LUNG: CTAB;  No rales, rhonchi, wheezing, or rubs  HEART: Regular rate and rhythm; No murmurs, rubs, or gallops  ABDOMEN: Soft, Nontender, Nondistended; Bowel sounds present  EXTREMITIES:  2+ Peripheral Pulses b/l, No clubbing, cyanosis, or edema b/l     Labs    no new labs     Consultant(s) Notes Reviewed [x ] Yes     Care Discussed with [ x] Consultants  [ x] Patient  [x ] Family --father over the phone  [phone], [phone]  [ x] /   [x ] Other; RN

## 2022-12-26 NOTE — PROGRESS NOTE ADULT - REASON FOR ADMISSION
COVID-19 pneumonia

## 2022-12-27 ENCOUNTER — TRANSCRIPTION ENCOUNTER (OUTPATIENT)
Age: 52
End: 2022-12-27

## 2022-12-27 VITALS
HEART RATE: 83 BPM | SYSTOLIC BLOOD PRESSURE: 147 MMHG | TEMPERATURE: 98 F | DIASTOLIC BLOOD PRESSURE: 94 MMHG | OXYGEN SATURATION: 100 % | RESPIRATION RATE: 19 BRPM

## 2022-12-27 LAB
GLUCOSE BLDC GLUCOMTR-MCNC: 134 MG/DL — HIGH (ref 70–99)
GLUCOSE BLDC GLUCOMTR-MCNC: 98 MG/DL — SIGNIFICANT CHANGE UP (ref 70–99)

## 2022-12-27 PROCEDURE — 99239 HOSP IP/OBS DSCHRG MGMT >30: CPT

## 2022-12-27 RX ORDER — BUDESONIDE AND FORMOTEROL FUMARATE DIHYDRATE 160; 4.5 UG/1; UG/1
2 AEROSOL RESPIRATORY (INHALATION)
Qty: 1 | Refills: 0
Start: 2022-12-27

## 2022-12-27 RX ORDER — RISPERIDONE 4 MG/1
1 TABLET ORAL
Qty: 0 | Refills: 0 | DISCHARGE

## 2022-12-27 RX ORDER — ALBUTEROL 90 UG/1
2 AEROSOL, METERED ORAL
Qty: 1 | Refills: 0
Start: 2022-12-27

## 2022-12-27 RX ADMIN — TIOTROPIUM BROMIDE 2 PUFF(S): 18 CAPSULE ORAL; RESPIRATORY (INHALATION) at 11:13

## 2022-12-27 RX ADMIN — ENOXAPARIN SODIUM 40 MILLIGRAM(S): 100 INJECTION SUBCUTANEOUS at 11:12

## 2022-12-27 NOTE — DISCHARGE NOTE PROVIDER - HOSPITAL COURSE
52M PMH obesity, asthma, ?PRINCE, preDM presents for SOB, cough, fever Reports  + COVID test day prior to admission. Found to be hypoxic by EMS to the 50s on RA. ABG 7.19/>112/155/44/98% on 4LNC. Placed on bipap. Admitted to medical floor for COVID     Vital Signs Last 24 Hrs  T(C): 36.5 (27 Dec 2022 10:54), Max: 37.1 (27 Dec 2022 05:40)  T(F): 97.7 (27 Dec 2022 10:54), Max: 98.8 (27 Dec 2022 05:40)  HR: 83 (27 Dec 2022 10:54) (82 - 88)  BP: 147/94 (27 Dec 2022 10:54) (107/69 - 147/94)  BP(mean): --  RR: 19 (27 Dec 2022 10:54) (18 - 19)  SpO2: 100% (27 Dec 2022 10:54) (94% - 100%)    Parameters below as of 27 Dec 2022 10:54  Patient On (Oxygen Delivery Method): nasal cannula  O2 Flow (L/min): 2    GENERAL: NAD , no increased WOB, speaking in full sentences , nontoxic appearing   HEAD:  Atraumatic, Normocephalic  EYES: EOMI, PERRLA, conjunctiva and sclera clear  ENMT: No tonsillar erythema, exudates, or enlargement; Moist mucous membranes   NECK: Supple, No JVD   NERVOUS SYSTEM:  Alert & Oriented X3, Good concentration; nonfocal   CHEST/LUNG: CTAB;  No rales, rhonchi, wheezing, or rubs  HEART: Regular rate and rhythm; No murmurs, rubs, or gallops  ABDOMEN: Soft, Nontender, Nondistended; Bowel sounds present  EXTREMITIES:  2+ Peripheral Pulses b/l, No clubbing, cyanosis, or edema b/l      DISCHARGE DIAGNOSES:     #Acute Hypoxic Respiratory Failure   # acute on chronic hypercarbic respiratory failure  #  COVID-19   #Acute asthma exacerbation   # underlying chronic respiratory acidosis and metabolic alkalosis  #Obesity Hypoventilation Syndrome; PRESUMED PRINCE     - Initial AB.19, CO2: 114, HCO3: 38  CXR showed diffuse bilateral airspace infiltrates.  legionella, strep neg   - () Bipap initiated, 16/8 @50% transitioned to 6 L NC.  - Continuous pulse ox, monitor  - Titrate O2>92%  - () Start remdesivir. C/W bipap. Pulm consulted   () C/W remdesivir.    given diomox x 1 for met alk    bicarb improved ; now on 2L NC and doing well    will repeat ABG and BMP today appreciated and improved     --completed remdesivir 5 day course ,  decadron to complete 10 days   - Albuterol HFA prn   - will need outpatient pulmonary follow up, PFTs, sleep study  awaiting home O2   doing well on 2L NC     acute metabolic encephalopathy POA, now resolved   patient at baseline MS     # DM Type II  - Hga1c: 7.0  continue with metformin     Discharge time : 40 min   RETURN PARAMETERS DISCUSSED WITH PATIENT, PATIENT EXPRESSED UNDERSTANDING AND IS AGREEABLE. DISCUSSED WITH PATIENT ON REFRAINING FROM DRIVING UNTIL FOLLOW-UP/ CLEARED BY PMD. PATIENT EXPRESSED UNDERSTANDING.   Care plan and all findings were discussed in detail with patient.  All questions and concerns addressed   52M PMH obesity, asthma, ?PRINCE, preDM presents for SOB, cough, fever Reports  + COVID test day prior to admission. Found to be hypoxic by EMS to the 50s on RA. ABG 7.19/>112/155/44/98% on 4LNC. Placed on bipap. Admitted to medical floor for COVID     Vital Signs Last 24 Hrs  T(C): 36.5 (27 Dec 2022 10:54), Max: 37.1 (27 Dec 2022 05:40)  T(F): 97.7 (27 Dec 2022 10:54), Max: 98.8 (27 Dec 2022 05:40)  HR: 83 (27 Dec 2022 10:54) (82 - 88)  BP: 147/94 (27 Dec 2022 10:54) (107/69 - 147/94)  BP(mean): --  RR: 19 (27 Dec 2022 10:54) (18 - 19)  SpO2: 100% (27 Dec 2022 10:54) (94% - 100%)    Parameters below as of 27 Dec 2022 10:54  Patient On (Oxygen Delivery Method): nasal cannula  O2 Flow (L/min): 2    GENERAL: NAD , no increased WOB, speaking in full sentences , nontoxic appearing   HEAD:  Atraumatic, Normocephalic  EYES: EOMI, PERRLA, conjunctiva and sclera clear  ENMT: No tonsillar erythema, exudates, or enlargement; Moist mucous membranes   NECK: Supple, No JVD   NERVOUS SYSTEM:  Alert & Oriented X3, Good concentration; nonfocal   CHEST/LUNG: CTAB;  No rales, rhonchi, wheezing, or rubs  HEART: Regular rate and rhythm; No murmurs, rubs, or gallops  ABDOMEN: Soft, Nontender, Nondistended; Bowel sounds present  EXTREMITIES:  2+ Peripheral Pulses b/l, No clubbing, cyanosis, or edema b/l      DISCHARGE DIAGNOSES:     #Acute Hypoxic Respiratory Failure   # acute on chronic hypercarbic respiratory failure  #  COVID-19   #Acute asthma exacerbation   # underlying chronic respiratory acidosis and metabolic alkalosis  #Obesity Hypoventilation Syndrome; PRESUMED PRINCE     - Initial AB.19, CO2: 114, HCO3: 38  CXR showed diffuse bilateral airspace infiltrates.  legionella, strep neg   - () Bipap initiated, 16/8 @50% transitioned to 6 L NC.  - Continuous pulse ox, monitor  - Titrate O2>92%  - () Start remdesivir. C/W bipap. Pulm consulted   () C/W remdesivir.    given diomox x 1 for met alk    bicarb improved ; now on 2L NC and doing well    will repeat ABG and BMP today appreciated and improved     --completed remdesivir 5 day course ,  decadron to complete 10 days   - Albuterol HFA prn , symbicort    doing well on 2L NC   patient has been off of BIPAP, doesn't use even at night and doing well.   - will need outpatient pulmonary follow up, PFTs, sleep study  home O2 delivered to patient's home     acute metabolic encephalopathy POA, now resolved   patient at baseline MS     # DM Type II  - Hga1c: 7.0  continue with metformin     Discharge time : 40 min   RETURN PARAMETERS DISCUSSED WITH PATIENT, PATIENT EXPRESSED UNDERSTANDING AND IS AGREEABLE. DISCUSSED WITH PATIENT ON REFRAINING FROM DRIVING UNTIL FOLLOW-UP/ CLEARED BY PMD. PATIENT EXPRESSED UNDERSTANDING.   Care plan and all findings were discussed in detail with patient.  All questions and concerns addressed

## 2022-12-27 NOTE — DISCHARGE NOTE NURSING/CASE MANAGEMENT/SOCIAL WORK - PATIENT PORTAL LINK FT
You can access the FollowMyHealth Patient Portal offered by [facility] by registering at the following website: [email or web address]. By joining [facility]’s FollowMyHealth portal, you will also be able to view your health information using other applications (apps) compatible with our system.

## 2022-12-27 NOTE — DISCHARGE NOTE PROVIDER - NSDCCPCAREPLAN_GEN_ALL_CORE_FT
PRINCIPAL DISCHARGE DIAGNOSIS  Diagnosis: Pneumonia due to COVID-19 virus  Assessment and Plan of Treatment:       SECONDARY DISCHARGE DIAGNOSES  Diagnosis: Acute on chronic respiratory failure with hypercapnia  Assessment and Plan of Treatment:     Diagnosis: Obesity hypoventilation syndrome  Assessment and Plan of Treatment:

## 2022-12-27 NOTE — DISCHARGE NOTE PROVIDER - DISCHARGE SERVICE FOR PATIENT
on the discharge service for the patient. I have reviewed and made amendments to the documentation where necessary. 14-May-2019

## 2022-12-27 NOTE — DISCHARGE NOTE NURSING/CASE MANAGEMENT/SOCIAL WORK - NSDCPEFALRISK_GEN_ALL_CORE
For information on Fall & Injury Prevention, visit: [email or web address] OR  [email or web address] OR  https://www.cdc.gov/steadi/patient.html

## 2022-12-27 NOTE — DISCHARGE NOTE NURSING/CASE MANAGEMENT/SOCIAL WORK - NSDCFUADDAPPT_GEN_ALL_CORE_FT
It is important to see your primary physician as well as other necessary consultants within the next week to perform a comprehensive medical review.  Call their offices for an appointment as soon as you leave the hospital.  If you do not have a primary physician or cant reach him/her, contact the [facility] Physician Referral Service at [phone].  Your medical issues appear to be stable at this time, but if your symptoms recur or worsen, contact your physicians and/or return to the hospital if necessary.  If you encounter any issues or questions with your medication, call your physicians before stopping the medication.

## 2022-12-27 NOTE — DISCHARGE NOTE PROVIDER - NSFOLLOWUPCLINICS_GEN_ALL_ED_FT
Pediatric Pulmonary Medicine  Pediatric Pulmonary Medicine  [address], [address]  [place] [address]  Phone: [phone]  Fax: [phone]  Follow Up Time: 1 week

## 2022-12-27 NOTE — DISCHARGE NOTE PROVIDER - NSDCMRMEDTOKEN_GEN_ALL_CORE_FT
albuterol 90 mcg/inh inhalation aerosol: 2 puff(s) inhaled every 6 hours, As needed, Shortness of Breath  metFORMIN 500 mg oral tablet, extended release: 1 tab(s) orally once a day  Symbicort 80 mcg-4.5 mcg/inh inhalation aerosol: 2 puff(s) inhaled 2 times a day

## 2022-12-27 NOTE — DISCHARGE NOTE PROVIDER - NSDCFUADDAPPT_GEN_ALL_CORE_FT
It is important to see your primary physician as well as other necessary consultants within the next week to perform a comprehensive medical review.  Call their offices for an appointment as soon as you leave the hospital.  If you do not have a primary physician or cant reach him/her, contact the [facility] Physician Referral Service at [phone].  Your medical issues appear to be stable at this time, but if your symptoms recur or worsen, contact your physicians and/or return to the hospital if necessary.  If you encounter any issues or questions with your medication, call your physicians before stopping the medication.

## 2023-01-03 DIAGNOSIS — J96.01 ACUTE RESPIRATORY FAILURE WITH HYPOXIA: ICD-10-CM

## 2023-01-03 DIAGNOSIS — G93.41 METABOLIC ENCEPHALOPATHY: ICD-10-CM

## 2023-01-03 DIAGNOSIS — Z88.0 ALLERGY STATUS TO PENICILLIN: ICD-10-CM

## 2023-01-03 DIAGNOSIS — U07.1 COVID-19: ICD-10-CM

## 2023-01-03 DIAGNOSIS — J12.82 PNEUMONIA DUE TO CORONAVIRUS DISEASE 2019: ICD-10-CM

## 2023-01-03 DIAGNOSIS — J96.22 ACUTE AND CHRONIC RESPIRATORY FAILURE WITH HYPERCAPNIA: ICD-10-CM

## 2023-01-03 DIAGNOSIS — Z79.84 LONG TERM (CURRENT) USE OF ORAL HYPOGLYCEMIC DRUGS: ICD-10-CM

## 2023-01-03 DIAGNOSIS — A41.9 SEPSIS, UNSPECIFIED ORGANISM: ICD-10-CM

## 2023-01-03 DIAGNOSIS — E66.2 MORBID (SEVERE) OBESITY WITH ALVEOLAR HYPOVENTILATION: ICD-10-CM

## 2023-01-03 DIAGNOSIS — E87.4 MIXED DISORDER OF ACID-BASE BALANCE: ICD-10-CM

## 2023-01-03 DIAGNOSIS — J45.901 UNSPECIFIED ASTHMA WITH (ACUTE) EXACERBATION: ICD-10-CM

## 2023-01-03 DIAGNOSIS — E11.9 TYPE 2 DIABETES MELLITUS WITHOUT COMPLICATIONS: ICD-10-CM

## 2025-01-01 ENCOUNTER — INPATIENT (INPATIENT)
Facility: HOSPITAL | Age: 55
LOS: 2 days | End: 2025-01-22
Attending: EMERGENCY MEDICINE | Admitting: EMERGENCY MEDICINE
Payer: MEDICARE

## 2025-01-01 ENCOUNTER — RESULT REVIEW (OUTPATIENT)
Age: 55
End: 2025-01-01

## 2025-01-01 VITALS
HEART RATE: 106 BPM | DIASTOLIC BLOOD PRESSURE: 75 MMHG | SYSTOLIC BLOOD PRESSURE: 148 MMHG | HEIGHT: 70 IN | WEIGHT: 184.97 LBS | OXYGEN SATURATION: 85 % | RESPIRATION RATE: 6 BRPM

## 2025-01-01 LAB
ALBUMIN SERPL ELPH-MCNC: 1.6 G/DL — LOW (ref 3.3–5)
ALBUMIN SERPL ELPH-MCNC: 1.6 G/DL — LOW (ref 3.3–5)
ALBUMIN SERPL ELPH-MCNC: 1.7 G/DL — LOW (ref 3.3–5)
ALBUMIN SERPL ELPH-MCNC: 2.1 G/DL — LOW (ref 3.3–5)
ALBUMIN SERPL ELPH-MCNC: 2.2 G/DL — LOW (ref 3.3–5)
ALBUMIN SERPL ELPH-MCNC: 2.2 G/DL — LOW (ref 3.3–5)
ALBUMIN SERPL ELPH-MCNC: 2.3 G/DL — LOW (ref 3.3–5)
ALBUMIN SERPL ELPH-MCNC: 2.3 G/DL — LOW (ref 3.3–5)
ALP SERPL-CCNC: 114 U/L — SIGNIFICANT CHANGE UP (ref 40–120)
ALP SERPL-CCNC: 121 U/L — HIGH (ref 40–120)
ALP SERPL-CCNC: 126 U/L — HIGH (ref 40–120)
ALP SERPL-CCNC: 128 U/L — HIGH (ref 40–120)
ALP SERPL-CCNC: 136 U/L — HIGH (ref 40–120)
ALP SERPL-CCNC: 141 U/L — HIGH (ref 40–120)
ALP SERPL-CCNC: 179 U/L — HIGH (ref 40–120)
ALP SERPL-CCNC: 198 U/L — HIGH (ref 40–120)
ALT FLD-CCNC: 3201 U/L — HIGH (ref 12–78)
ALT FLD-CCNC: 3674 U/L — HIGH (ref 12–78)
ALT FLD-CCNC: 3753 U/L — HIGH (ref 12–78)
ALT FLD-CCNC: 3923 U/L — HIGH (ref 12–78)
ALT FLD-CCNC: 4095 U/L — HIGH (ref 12–78)
ALT FLD-CCNC: 4199 U/L — HIGH (ref 12–78)
ALT FLD-CCNC: 4348 U/L — HIGH (ref 12–78)
ALT FLD-CCNC: 995 U/L — HIGH (ref 12–78)
ANION GAP SERPL CALC-SCNC: 12 MMOL/L — SIGNIFICANT CHANGE UP (ref 5–17)
ANION GAP SERPL CALC-SCNC: 13 MMOL/L — SIGNIFICANT CHANGE UP (ref 5–17)
ANION GAP SERPL CALC-SCNC: 14 MMOL/L — SIGNIFICANT CHANGE UP (ref 5–17)
ANION GAP SERPL CALC-SCNC: 24 MMOL/L — HIGH (ref 5–17)
ANION GAP SERPL CALC-SCNC: 25 MMOL/L — HIGH (ref 5–17)
ANION GAP SERPL CALC-SCNC: 25 MMOL/L — HIGH (ref 5–17)
ANION GAP SERPL CALC-SCNC: 27 MMOL/L — HIGH (ref 5–17)
ANION GAP SERPL CALC-SCNC: 8 MMOL/L — SIGNIFICANT CHANGE UP (ref 5–17)
ANISOCYTOSIS BLD QL: SLIGHT — SIGNIFICANT CHANGE UP
APTT BLD: 32.2 SEC — SIGNIFICANT CHANGE UP (ref 24.5–35.6)
AST SERPL-CCNC: 1133 U/L — HIGH (ref 15–37)
AST SERPL-CCNC: 3203 U/L — HIGH (ref 15–37)
AST SERPL-CCNC: 3670 U/L — HIGH (ref 15–37)
AST SERPL-CCNC: 4593 U/L — HIGH (ref 15–37)
AST SERPL-CCNC: 5838 U/L — HIGH (ref 15–37)
AST SERPL-CCNC: 6689 U/L — HIGH (ref 15–37)
AST SERPL-CCNC: 8056 U/L — HIGH (ref 15–37)
AST SERPL-CCNC: >2002 U/L — HIGH (ref 15–37)
BASE EXCESS BLDA CALC-SCNC: -6.4 MMOL/L — LOW (ref -2–3)
BASOPHILS # BLD AUTO: 0 K/UL — SIGNIFICANT CHANGE UP (ref 0–0.2)
BASOPHILS # BLD AUTO: 0.07 K/UL — SIGNIFICANT CHANGE UP (ref 0–0.2)
BASOPHILS # BLD AUTO: 0.08 K/UL — SIGNIFICANT CHANGE UP (ref 0–0.2)
BASOPHILS NFR BLD AUTO: 0 % — SIGNIFICANT CHANGE UP (ref 0–2)
BASOPHILS NFR BLD AUTO: 0.4 % — SIGNIFICANT CHANGE UP (ref 0–2)
BASOPHILS NFR BLD AUTO: 0.4 % — SIGNIFICANT CHANGE UP (ref 0–2)
BILIRUB SERPL-MCNC: 0.6 MG/DL — SIGNIFICANT CHANGE UP (ref 0.2–1.2)
BILIRUB SERPL-MCNC: 1.2 MG/DL — SIGNIFICANT CHANGE UP (ref 0.2–1.2)
BILIRUB SERPL-MCNC: 1.2 MG/DL — SIGNIFICANT CHANGE UP (ref 0.2–1.2)
BILIRUB SERPL-MCNC: 1.4 MG/DL — HIGH (ref 0.2–1.2)
BILIRUB SERPL-MCNC: 1.7 MG/DL — HIGH (ref 0.2–1.2)
BILIRUB SERPL-MCNC: 2.3 MG/DL — HIGH (ref 0.2–1.2)
BILIRUB SERPL-MCNC: 2.9 MG/DL — HIGH (ref 0.2–1.2)
BILIRUB SERPL-MCNC: 2.9 MG/DL — HIGH (ref 0.2–1.2)
BLOOD GAS COMMENTS ARTERIAL: SIGNIFICANT CHANGE UP
BUN SERPL-MCNC: 25 MG/DL — HIGH (ref 7–23)
BUN SERPL-MCNC: 32 MG/DL — HIGH (ref 7–23)
BUN SERPL-MCNC: 42 MG/DL — HIGH (ref 7–23)
BUN SERPL-MCNC: 57 MG/DL — HIGH (ref 7–23)
BUN SERPL-MCNC: 64 MG/DL — HIGH (ref 7–23)
BUN SERPL-MCNC: 69 MG/DL — HIGH (ref 7–23)
BUN SERPL-MCNC: 87 MG/DL — HIGH (ref 7–23)
BUN SERPL-MCNC: 87 MG/DL — HIGH (ref 7–23)
BUN SERPL-MCNC: 88 MG/DL — HIGH (ref 7–23)
BUN SERPL-MCNC: 94 MG/DL — HIGH (ref 7–23)
CALCIUM SERPL-MCNC: 5.2 MG/DL — CRITICAL LOW (ref 8.5–10.1)
CALCIUM SERPL-MCNC: 5.8 MG/DL — CRITICAL LOW (ref 8.5–10.1)
CALCIUM SERPL-MCNC: 6.3 MG/DL — CRITICAL LOW (ref 8.5–10.1)
CALCIUM SERPL-MCNC: 7.5 MG/DL — LOW (ref 8.5–10.1)
CALCIUM SERPL-MCNC: 7.6 MG/DL — LOW (ref 8.5–10.1)
CALCIUM SERPL-MCNC: 7.6 MG/DL — LOW (ref 8.5–10.1)
CALCIUM SERPL-MCNC: 8 MG/DL — LOW (ref 8.5–10.1)
CALCIUM SERPL-MCNC: 8.2 MG/DL — LOW (ref 8.5–10.1)
CALCIUM SERPL-MCNC: 8.8 MG/DL — SIGNIFICANT CHANGE UP (ref 8.5–10.1)
CALCIUM SERPL-MCNC: <3 MG/DL (ref 8.5–10.1)
CHLORIDE SERPL-SCNC: 87 MMOL/L — LOW (ref 96–108)
CHLORIDE SERPL-SCNC: 90 MMOL/L — LOW (ref 96–108)
CHLORIDE SERPL-SCNC: 90 MMOL/L — LOW (ref 96–108)
CHLORIDE SERPL-SCNC: 94 MMOL/L — LOW (ref 96–108)
CHLORIDE SERPL-SCNC: 95 MMOL/L — LOW (ref 96–108)
CHLORIDE SERPL-SCNC: 95 MMOL/L — LOW (ref 96–108)
CHLORIDE SERPL-SCNC: 97 MMOL/L — SIGNIFICANT CHANGE UP (ref 96–108)
CHLORIDE SERPL-SCNC: 98 MMOL/L — SIGNIFICANT CHANGE UP (ref 96–108)
CK MB BLD-MCNC: <1.1 % — SIGNIFICANT CHANGE UP (ref 0–3.5)
CK MB CFR SERPL CALC: <1 NG/ML — SIGNIFICANT CHANGE UP (ref 0.5–3.6)
CK SERPL-CCNC: 89 U/L — SIGNIFICANT CHANGE UP (ref 26–308)
CO2 BLDA-SCNC: 28 MMOL/L — HIGH (ref 19–24)
CO2 SERPL-SCNC: 15 MMOL/L — LOW (ref 22–31)
CO2 SERPL-SCNC: 17 MMOL/L — LOW (ref 22–31)
CO2 SERPL-SCNC: 18 MMOL/L — LOW (ref 22–31)
CO2 SERPL-SCNC: 19 MMOL/L — LOW (ref 22–31)
CO2 SERPL-SCNC: 27 MMOL/L — SIGNIFICANT CHANGE UP (ref 22–31)
CO2 SERPL-SCNC: 28 MMOL/L — SIGNIFICANT CHANGE UP (ref 22–31)
CO2 SERPL-SCNC: 30 MMOL/L — SIGNIFICANT CHANGE UP (ref 22–31)
CREAT SERPL-MCNC: 1.74 MG/DL — HIGH (ref 0.5–1.3)
CREAT SERPL-MCNC: 2.03 MG/DL — HIGH (ref 0.5–1.3)
CREAT SERPL-MCNC: 2.61 MG/DL — HIGH (ref 0.5–1.3)
CREAT SERPL-MCNC: 4.66 MG/DL — HIGH (ref 0.5–1.3)
CREAT SERPL-MCNC: 4.97 MG/DL — HIGH (ref 0.5–1.3)
CREAT SERPL-MCNC: 5.53 MG/DL — HIGH (ref 0.5–1.3)
CREAT SERPL-MCNC: 7.66 MG/DL — HIGH (ref 0.5–1.3)
CREAT SERPL-MCNC: 7.72 MG/DL — HIGH (ref 0.5–1.3)
CREAT SERPL-MCNC: 7.77 MG/DL — HIGH (ref 0.5–1.3)
CREAT SERPL-MCNC: 7.95 MG/DL — HIGH (ref 0.5–1.3)
CULTURE RESULTS: SIGNIFICANT CHANGE UP
EGFR: 11 ML/MIN/1.73M2 — LOW
EGFR: 13 ML/MIN/1.73M2 — LOW
EGFR: 14 ML/MIN/1.73M2 — LOW
EGFR: 28 ML/MIN/1.73M2 — LOW
EGFR: 38 ML/MIN/1.73M2 — LOW
EGFR: 46 ML/MIN/1.73M2 — LOW
EGFR: 7 ML/MIN/1.73M2 — LOW
EGFR: 8 ML/MIN/1.73M2 — LOW
EOSINOPHIL # BLD AUTO: 0 K/UL — SIGNIFICANT CHANGE UP (ref 0–0.5)
EOSINOPHIL # BLD AUTO: 0.02 K/UL — SIGNIFICANT CHANGE UP (ref 0–0.5)
EOSINOPHIL # BLD AUTO: 0.05 K/UL — SIGNIFICANT CHANGE UP (ref 0–0.5)
EOSINOPHIL NFR BLD AUTO: 0 % — SIGNIFICANT CHANGE UP (ref 0–6)
EOSINOPHIL NFR BLD AUTO: 0.1 % — SIGNIFICANT CHANGE UP (ref 0–6)
EOSINOPHIL NFR BLD AUTO: 0.3 % — SIGNIFICANT CHANGE UP (ref 0–6)
FLUAV AG NPH QL: SIGNIFICANT CHANGE UP
FLUBV AG NPH QL: SIGNIFICANT CHANGE UP
GAS PNL BLDA: SIGNIFICANT CHANGE UP
GLUCOSE BLDC GLUCOMTR-MCNC: 111 MG/DL — HIGH (ref 70–99)
GLUCOSE BLDC GLUCOMTR-MCNC: 111 MG/DL — HIGH (ref 70–99)
GLUCOSE BLDC GLUCOMTR-MCNC: 119 MG/DL — HIGH (ref 70–99)
GLUCOSE BLDC GLUCOMTR-MCNC: 125 MG/DL — HIGH (ref 70–99)
GLUCOSE BLDC GLUCOMTR-MCNC: 125 MG/DL — HIGH (ref 70–99)
GLUCOSE BLDC GLUCOMTR-MCNC: 134 MG/DL — HIGH (ref 70–99)
GLUCOSE BLDC GLUCOMTR-MCNC: 150 MG/DL — HIGH (ref 70–99)
GLUCOSE BLDC GLUCOMTR-MCNC: 152 MG/DL — HIGH (ref 70–99)
GLUCOSE BLDC GLUCOMTR-MCNC: 182 MG/DL — HIGH (ref 70–99)
GLUCOSE BLDC GLUCOMTR-MCNC: 182 MG/DL — HIGH (ref 70–99)
GLUCOSE BLDC GLUCOMTR-MCNC: 204 MG/DL — HIGH (ref 70–99)
GLUCOSE BLDC GLUCOMTR-MCNC: 205 MG/DL — HIGH (ref 70–99)
GLUCOSE BLDC GLUCOMTR-MCNC: 21 MG/DL — CRITICAL LOW (ref 70–99)
GLUCOSE BLDC GLUCOMTR-MCNC: 224 MG/DL — HIGH (ref 70–99)
GLUCOSE BLDC GLUCOMTR-MCNC: 23 MG/DL — CRITICAL LOW (ref 70–99)
GLUCOSE BLDC GLUCOMTR-MCNC: 246 MG/DL — HIGH (ref 70–99)
GLUCOSE BLDC GLUCOMTR-MCNC: 279 MG/DL — HIGH (ref 70–99)
GLUCOSE BLDC GLUCOMTR-MCNC: 291 MG/DL — HIGH (ref 70–99)
GLUCOSE BLDC GLUCOMTR-MCNC: 31 MG/DL — CRITICAL LOW (ref 70–99)
GLUCOSE BLDC GLUCOMTR-MCNC: 378 MG/DL — HIGH (ref 70–99)
GLUCOSE BLDC GLUCOMTR-MCNC: 39 MG/DL — CRITICAL LOW (ref 70–99)
GLUCOSE BLDC GLUCOMTR-MCNC: 41 MG/DL — CRITICAL LOW (ref 70–99)
GLUCOSE BLDC GLUCOMTR-MCNC: 55 MG/DL — LOW (ref 70–99)
GLUCOSE BLDC GLUCOMTR-MCNC: 65 MG/DL — LOW (ref 70–99)
GLUCOSE BLDC GLUCOMTR-MCNC: 68 MG/DL — LOW (ref 70–99)
GLUCOSE BLDC GLUCOMTR-MCNC: 68 MG/DL — LOW (ref 70–99)
GLUCOSE BLDC GLUCOMTR-MCNC: >600 MG/DL — CRITICAL HIGH (ref 70–99)
GLUCOSE SERPL-MCNC: 106 MG/DL — HIGH (ref 70–99)
GLUCOSE SERPL-MCNC: 127 MG/DL — HIGH (ref 70–99)
GLUCOSE SERPL-MCNC: 150 MG/DL — HIGH (ref 70–99)
GLUCOSE SERPL-MCNC: 168 MG/DL — HIGH (ref 70–99)
GLUCOSE SERPL-MCNC: 182 MG/DL — HIGH (ref 70–99)
GLUCOSE SERPL-MCNC: 199 MG/DL — HIGH (ref 70–99)
GLUCOSE SERPL-MCNC: 210 MG/DL — HIGH (ref 70–99)
GLUCOSE SERPL-MCNC: 212 MG/DL — HIGH (ref 70–99)
GLUCOSE SERPL-MCNC: 238 MG/DL — HIGH (ref 70–99)
GLUCOSE SERPL-MCNC: 299 MG/DL — HIGH (ref 70–99)
GRAM STN FLD: SIGNIFICANT CHANGE UP
GRAM STN FLD: SIGNIFICANT CHANGE UP
HCO3 BLDA-SCNC: 26 MMOL/L — SIGNIFICANT CHANGE UP (ref 21–28)
HCOV PNL SPEC NAA+PROBE: DETECTED
HCT VFR BLD CALC: 42.8 % — SIGNIFICANT CHANGE UP (ref 39–50)
HCT VFR BLD CALC: 43.7 % — SIGNIFICANT CHANGE UP (ref 39–50)
HCT VFR BLD CALC: 43.9 % — SIGNIFICANT CHANGE UP (ref 39–50)
HCT VFR BLD CALC: 45.3 % — SIGNIFICANT CHANGE UP (ref 39–50)
HGB BLD-MCNC: 13.1 G/DL — SIGNIFICANT CHANGE UP (ref 13–17)
HGB BLD-MCNC: 13.1 G/DL — SIGNIFICANT CHANGE UP (ref 13–17)
HGB BLD-MCNC: 13.9 G/DL — SIGNIFICANT CHANGE UP (ref 13–17)
HGB BLD-MCNC: 14 G/DL — SIGNIFICANT CHANGE UP (ref 13–17)
HOROWITZ INDEX BLDA+IHG-RTO: 100 — SIGNIFICANT CHANGE UP
IMM GRANULOCYTES NFR BLD AUTO: 1 % — HIGH (ref 0–0.9)
IMM GRANULOCYTES NFR BLD AUTO: 1.9 % — HIGH (ref 0–0.9)
INR BLD: 1.48 RATIO — HIGH (ref 0.85–1.16)
LACTATE SERPL-SCNC: 10.7 MMOL/L — CRITICAL HIGH (ref 0.7–2)
LACTATE SERPL-SCNC: 15.1 MMOL/L — CRITICAL HIGH (ref 0.7–2)
LEGIONELLA AG UR QL: NEGATIVE — SIGNIFICANT CHANGE UP
LG PLATELETS BLD QL AUTO: SLIGHT — SIGNIFICANT CHANGE UP
LYMPHOCYTES # BLD AUTO: 1.22 K/UL — SIGNIFICANT CHANGE UP (ref 1–3.3)
LYMPHOCYTES # BLD AUTO: 1.42 K/UL — SIGNIFICANT CHANGE UP (ref 1–3.3)
LYMPHOCYTES # BLD AUTO: 27 % — SIGNIFICANT CHANGE UP (ref 13–44)
LYMPHOCYTES # BLD AUTO: 3.94 K/UL — HIGH (ref 1–3.3)
LYMPHOCYTES # BLD AUTO: 6.4 % — LOW (ref 13–44)
LYMPHOCYTES # BLD AUTO: 7.2 % — LOW (ref 13–44)
M PNEUMO IGM SER-ACNC: 0.18 INDEX — SIGNIFICANT CHANGE UP (ref 0–0.9)
MACROCYTES BLD QL: SLIGHT — SIGNIFICANT CHANGE UP
MAGNESIUM SERPL-MCNC: 1.5 MG/DL — LOW (ref 1.6–2.6)
MAGNESIUM SERPL-MCNC: 1.8 MG/DL — SIGNIFICANT CHANGE UP (ref 1.6–2.6)
MAGNESIUM SERPL-MCNC: 2.1 MG/DL — SIGNIFICANT CHANGE UP (ref 1.6–2.6)
MAGNESIUM SERPL-MCNC: 2.2 MG/DL — SIGNIFICANT CHANGE UP (ref 1.6–2.6)
MAGNESIUM SERPL-MCNC: 2.3 MG/DL — SIGNIFICANT CHANGE UP (ref 1.6–2.6)
MAGNESIUM SERPL-MCNC: 2.9 MG/DL — HIGH (ref 1.6–2.6)
MAGNESIUM SERPL-MCNC: 2.9 MG/DL — HIGH (ref 1.6–2.6)
MAGNESIUM SERPL-MCNC: 3.1 MG/DL — HIGH (ref 1.6–2.6)
MANUAL SMEAR VERIFICATION: SIGNIFICANT CHANGE UP
MANUAL SMEAR VERIFICATION: SIGNIFICANT CHANGE UP
MCHC RBC-ENTMCNC: 27 PG — SIGNIFICANT CHANGE UP (ref 27–34)
MCHC RBC-ENTMCNC: 27.3 PG — SIGNIFICANT CHANGE UP (ref 27–34)
MCHC RBC-ENTMCNC: 27.5 PG — SIGNIFICANT CHANGE UP (ref 27–34)
MCHC RBC-ENTMCNC: 27.7 PG — SIGNIFICANT CHANGE UP (ref 27–34)
MCHC RBC-ENTMCNC: 28.9 G/DL — LOW (ref 32–36)
MCHC RBC-ENTMCNC: 30.6 G/DL — LOW (ref 32–36)
MCHC RBC-ENTMCNC: 31.8 G/DL — LOW (ref 32–36)
MCHC RBC-ENTMCNC: 31.9 G/DL — LOW (ref 32–36)
MCV RBC AUTO: 85.7 FL — SIGNIFICANT CHANGE UP (ref 80–100)
MCV RBC AUTO: 87.2 FL — SIGNIFICANT CHANGE UP (ref 80–100)
MCV RBC AUTO: 89.7 FL — SIGNIFICANT CHANGE UP (ref 80–100)
MCV RBC AUTO: 93.2 FL — SIGNIFICANT CHANGE UP (ref 80–100)
METAMYELOCYTES # FLD: 5 % — HIGH (ref 0–0)
METAMYELOCYTES NFR BLD: 5 % — HIGH (ref 0–0)
MONOCYTES # BLD AUTO: 0.73 K/UL — SIGNIFICANT CHANGE UP (ref 0–0.9)
MONOCYTES # BLD AUTO: 0.94 K/UL — HIGH (ref 0–0.9)
MONOCYTES # BLD AUTO: 1.24 K/UL — HIGH (ref 0–0.9)
MONOCYTES NFR BLD AUTO: 4.9 % — SIGNIFICANT CHANGE UP (ref 2–14)
MONOCYTES NFR BLD AUTO: 5 % — SIGNIFICANT CHANGE UP (ref 2–14)
MONOCYTES NFR BLD AUTO: 6.3 % — SIGNIFICANT CHANGE UP (ref 2–14)
MRSA PCR RESULT.: SIGNIFICANT CHANGE UP
MYCOPLASMA AG SPEC QL: NEGATIVE — SIGNIFICANT CHANGE UP
NEUTROPHILS # BLD AUTO: 16.52 K/UL — HIGH (ref 1.8–7.4)
NEUTROPHILS # BLD AUTO: 16.66 K/UL — HIGH (ref 1.8–7.4)
NEUTROPHILS # BLD AUTO: 9.2 K/UL — HIGH (ref 1.8–7.4)
NEUTROPHILS NFR BLD AUTO: 63 % — SIGNIFICANT CHANGE UP (ref 43–77)
NEUTROPHILS NFR BLD AUTO: 84.1 % — HIGH (ref 43–77)
NEUTROPHILS NFR BLD AUTO: 87 % — HIGH (ref 43–77)
NRBC # BLD: 0 /100 WBCS — SIGNIFICANT CHANGE UP (ref 0–0)
NRBC # BLD: 0 /100 WBCS — SIGNIFICANT CHANGE UP (ref 0–0)
NRBC # BLD: 1 /100 WBCS — HIGH (ref 0–0)
NRBC # BLD: 4 /100 WBCS — HIGH (ref 0–0)
NRBC # BLD: SIGNIFICANT CHANGE UP /100 WBCS (ref 0–0)
NRBC BLD-RTO: 0 /100 WBCS — SIGNIFICANT CHANGE UP (ref 0–0)
NRBC BLD-RTO: 0 /100 WBCS — SIGNIFICANT CHANGE UP (ref 0–0)
NRBC BLD-RTO: 1 /100 WBCS — HIGH (ref 0–0)
NRBC BLD-RTO: 4 /100 WBCS — HIGH (ref 0–0)
NRBC BLD-RTO: SIGNIFICANT CHANGE UP /100 WBCS (ref 0–0)
NT-PROBNP SERPL-SCNC: 3973 PG/ML — HIGH (ref 0–125)
PCO2 BLDA: 89 MMHG — CRITICAL HIGH (ref 32–46)
PH BLDA: 7.07 — CRITICAL LOW (ref 7.35–7.45)
PHOSPHATE SERPL-MCNC: 1 MG/DL — CRITICAL LOW (ref 2.5–4.5)
PHOSPHATE SERPL-MCNC: 1.5 MG/DL — LOW (ref 2.5–4.5)
PHOSPHATE SERPL-MCNC: 15.8 MG/DL — SIGNIFICANT CHANGE UP (ref 2.5–4.5)
PHOSPHATE SERPL-MCNC: 16.5 MG/DL — HIGH (ref 2.5–4.5)
PHOSPHATE SERPL-MCNC: 16.9 MG/DL — SIGNIFICANT CHANGE UP (ref 2.5–4.5)
PHOSPHATE SERPL-MCNC: 4.5 MG/DL — SIGNIFICANT CHANGE UP (ref 2.5–4.5)
PHOSPHATE SERPL-MCNC: 7.4 MG/DL — HIGH (ref 2.5–4.5)
PHOSPHATE SERPL-MCNC: 8.4 MG/DL — SIGNIFICANT CHANGE UP (ref 2.5–4.5)
PLAT MORPH BLD: NORMAL — SIGNIFICANT CHANGE UP
PLAT MORPH BLD: NORMAL — SIGNIFICANT CHANGE UP
PLATELET # BLD AUTO: 112 K/UL — LOW (ref 150–400)
PLATELET # BLD AUTO: 118 K/UL — LOW (ref 150–400)
PLATELET # BLD AUTO: 126 K/UL — LOW (ref 150–400)
PLATELET # BLD AUTO: 152 K/UL — SIGNIFICANT CHANGE UP (ref 150–400)
PO2 BLDA: 76 MMHG — LOW (ref 83–108)
POIKILOCYTOSIS BLD QL AUTO: SLIGHT — SIGNIFICANT CHANGE UP
POLYCHROMASIA BLD QL SMEAR: SLIGHT — SIGNIFICANT CHANGE UP
POTASSIUM SERPL-MCNC: 4 MMOL/L — SIGNIFICANT CHANGE UP (ref 3.5–5.3)
POTASSIUM SERPL-MCNC: 4.1 MMOL/L — SIGNIFICANT CHANGE UP (ref 3.5–5.3)
POTASSIUM SERPL-MCNC: 4.4 MMOL/L — SIGNIFICANT CHANGE UP (ref 3.5–5.3)
POTASSIUM SERPL-MCNC: 4.5 MMOL/L — SIGNIFICANT CHANGE UP (ref 3.5–5.3)
POTASSIUM SERPL-MCNC: 4.8 MMOL/L — SIGNIFICANT CHANGE UP (ref 3.5–5.3)
POTASSIUM SERPL-MCNC: 5.1 MMOL/L — SIGNIFICANT CHANGE UP (ref 3.5–5.3)
POTASSIUM SERPL-MCNC: 6.6 MMOL/L — CRITICAL HIGH (ref 3.5–5.3)
POTASSIUM SERPL-MCNC: 7.5 MMOL/L — CRITICAL HIGH (ref 3.5–5.3)
POTASSIUM SERPL-MCNC: 7.9 MMOL/L — CRITICAL HIGH (ref 3.5–5.3)
POTASSIUM SERPL-MCNC: 8.2 MMOL/L — CRITICAL HIGH (ref 3.5–5.3)
POTASSIUM SERPL-SCNC: 4 MMOL/L — SIGNIFICANT CHANGE UP (ref 3.5–5.3)
POTASSIUM SERPL-SCNC: 4.1 MMOL/L — SIGNIFICANT CHANGE UP (ref 3.5–5.3)
POTASSIUM SERPL-SCNC: 4.4 MMOL/L — SIGNIFICANT CHANGE UP (ref 3.5–5.3)
POTASSIUM SERPL-SCNC: 4.5 MMOL/L — SIGNIFICANT CHANGE UP (ref 3.5–5.3)
POTASSIUM SERPL-SCNC: 4.8 MMOL/L — SIGNIFICANT CHANGE UP (ref 3.5–5.3)
POTASSIUM SERPL-SCNC: 5.1 MMOL/L — SIGNIFICANT CHANGE UP (ref 3.5–5.3)
POTASSIUM SERPL-SCNC: 6.6 MMOL/L — CRITICAL HIGH (ref 3.5–5.3)
POTASSIUM SERPL-SCNC: 7.5 MMOL/L — CRITICAL HIGH (ref 3.5–5.3)
POTASSIUM SERPL-SCNC: 7.9 MMOL/L — CRITICAL HIGH (ref 3.5–5.3)
POTASSIUM SERPL-SCNC: 8.2 MMOL/L — CRITICAL HIGH (ref 3.5–5.3)
PROCALCITONIN SERPL-MCNC: 188.4 NG/ML — HIGH (ref 0.02–0.1)
PROT SERPL-MCNC: 5.3 GM/DL — LOW (ref 6–8.3)
PROT SERPL-MCNC: 5.4 GM/DL — LOW (ref 6–8.3)
PROT SERPL-MCNC: 5.8 GM/DL — LOW (ref 6–8.3)
PROT SERPL-MCNC: 6.5 GM/DL — SIGNIFICANT CHANGE UP (ref 6–8.3)
PROT SERPL-MCNC: 6.8 GM/DL — SIGNIFICANT CHANGE UP (ref 6–8.3)
PROT SERPL-MCNC: 6.8 GM/DL — SIGNIFICANT CHANGE UP (ref 6–8.3)
PROT SERPL-MCNC: 7.1 GM/DL — SIGNIFICANT CHANGE UP (ref 6–8.3)
PROT SERPL-MCNC: 7.2 GM/DL — SIGNIFICANT CHANGE UP (ref 6–8.3)
PROTHROM AB SERPL-ACNC: 17.1 SEC — HIGH (ref 9.9–13.4)
RAPID RVP RESULT: DETECTED
RBC # BLD: 4.77 M/UL — SIGNIFICANT CHANGE UP (ref 4.2–5.8)
RBC # BLD: 4.86 M/UL — SIGNIFICANT CHANGE UP (ref 4.2–5.8)
RBC # BLD: 5.01 M/UL — SIGNIFICANT CHANGE UP (ref 4.2–5.8)
RBC # BLD: 5.12 M/UL — SIGNIFICANT CHANGE UP (ref 4.2–5.8)
RBC # FLD: 13.3 % — SIGNIFICANT CHANGE UP (ref 10.3–14.5)
RBC # FLD: 13.5 % — SIGNIFICANT CHANGE UP (ref 10.3–14.5)
RBC # FLD: 14.6 % — HIGH (ref 10.3–14.5)
RBC # FLD: 15.3 % — HIGH (ref 10.3–14.5)
RBC BLD AUTO: ABNORMAL
RBC BLD AUTO: SIGNIFICANT CHANGE UP
RSV RNA NPH QL NAA+NON-PROBE: SIGNIFICANT CHANGE UP
S AUREUS DNA NOSE QL NAA+PROBE: DETECTED
S PNEUM AG UR QL: NEGATIVE — SIGNIFICANT CHANGE UP
SAO2 % BLDA: 90.4 % — LOW (ref 94–98)
SARS-COV-2 RNA SPEC QL NAA+PROBE: SIGNIFICANT CHANGE UP
SARS-COV-2 RNA SPEC QL NAA+PROBE: SIGNIFICANT CHANGE UP
SODIUM SERPL-SCNC: 131 MMOL/L — LOW (ref 135–145)
SODIUM SERPL-SCNC: 133 MMOL/L — LOW (ref 135–145)
SODIUM SERPL-SCNC: 134 MMOL/L — LOW (ref 135–145)
SODIUM SERPL-SCNC: 135 MMOL/L — SIGNIFICANT CHANGE UP (ref 135–145)
SODIUM SERPL-SCNC: 137 MMOL/L — SIGNIFICANT CHANGE UP (ref 135–145)
SODIUM SERPL-SCNC: 137 MMOL/L — SIGNIFICANT CHANGE UP (ref 135–145)
SODIUM SERPL-SCNC: 138 MMOL/L — SIGNIFICANT CHANGE UP (ref 135–145)
SODIUM SERPL-SCNC: 138 MMOL/L — SIGNIFICANT CHANGE UP (ref 135–145)
SPECIMEN SOURCE: SIGNIFICANT CHANGE UP
SPECIMEN SOURCE: SIGNIFICANT CHANGE UP
STOMATOCYTES BLD QL SMEAR: SLIGHT — SIGNIFICANT CHANGE UP
TROPONIN I, HIGH SENSITIVITY RESULT: 34.8 NG/L — SIGNIFICANT CHANGE UP
WBC # BLD: 11.48 K/UL — HIGH (ref 3.8–10.5)
WBC # BLD: 14.6 K/UL — HIGH (ref 3.8–10.5)
WBC # BLD: 19.13 K/UL — HIGH (ref 3.8–10.5)
WBC # BLD: 19.66 K/UL — HIGH (ref 3.8–10.5)
WBC # FLD AUTO: 11.48 K/UL — HIGH (ref 3.8–10.5)
WBC # FLD AUTO: 14.6 K/UL — HIGH (ref 3.8–10.5)
WBC # FLD AUTO: 19.13 K/UL — HIGH (ref 3.8–10.5)
WBC # FLD AUTO: 19.66 K/UL — HIGH (ref 3.8–10.5)

## 2025-01-01 PROCEDURE — 99291 CRITICAL CARE FIRST HOUR: CPT

## 2025-01-01 PROCEDURE — 71045 X-RAY EXAM CHEST 1 VIEW: CPT | Mod: 26

## 2025-01-01 PROCEDURE — 93010 ELECTROCARDIOGRAM REPORT: CPT

## 2025-01-01 PROCEDURE — 99291 CRITICAL CARE FIRST HOUR: CPT | Mod: 25

## 2025-01-01 PROCEDURE — 93306 TTE W/DOPPLER COMPLETE: CPT | Mod: 26

## 2025-01-01 PROCEDURE — 71250 CT THORAX DX C-: CPT | Mod: 26

## 2025-01-01 PROCEDURE — 70450 CT HEAD/BRAIN W/O DYE: CPT | Mod: 26

## 2025-01-01 PROCEDURE — 76604 US EXAM CHEST: CPT | Mod: 26

## 2025-01-01 PROCEDURE — 93308 TTE F-UP OR LMTD: CPT | Mod: 26

## 2025-01-01 PROCEDURE — 95816 EEG AWAKE AND DROWSY: CPT | Mod: 26

## 2025-01-01 PROCEDURE — 36620 INSERTION CATHETER ARTERY: CPT

## 2025-01-01 RX ORDER — LEVETIRACETAM 750 MG/1
500 TABLET, FILM COATED ORAL DAILY
Refills: 0 | Status: DISCONTINUED | OUTPATIENT
Start: 2025-01-01 | End: 2025-01-01

## 2025-01-01 RX ORDER — ANTISEPTIC SURGICAL SCRUB 0.04 MG/ML
15 SOLUTION TOPICAL EVERY 12 HOURS
Refills: 0 | Status: DISCONTINUED | OUTPATIENT
Start: 2025-01-01 | End: 2025-01-01

## 2025-01-01 RX ORDER — LEVETIRACETAM 750 MG/1
1000 TABLET, FILM COATED ORAL ONCE
Refills: 0 | Status: COMPLETED | OUTPATIENT
Start: 2025-01-01 | End: 2025-01-01

## 2025-01-01 RX ORDER — DM/PSEUDOEPHED/ACETAMINOPHEN 10-30-250
50 CAPSULE ORAL ONCE
Refills: 0 | Status: COMPLETED | OUTPATIENT
Start: 2025-01-01 | End: 2025-01-01

## 2025-01-01 RX ORDER — DM/PSEUDOEPHED/ACETAMINOPHEN 10-30-250
50 CAPSULE ORAL
Refills: 0 | Status: COMPLETED | OUTPATIENT
Start: 2025-01-01 | End: 2025-01-01

## 2025-01-01 RX ORDER — PANTOPRAZOLE 20 MG/1
40 TABLET, DELAYED RELEASE ORAL ONCE
Refills: 0 | Status: COMPLETED | OUTPATIENT
Start: 2025-01-01 | End: 2025-01-01

## 2025-01-01 RX ORDER — SODIUM BICARBONATE 42 MG/ML
50 INJECTION, SOLUTION INTRAVENOUS ONCE
Refills: 0 | Status: COMPLETED | OUTPATIENT
Start: 2025-01-01 | End: 2025-01-01

## 2025-01-01 RX ORDER — BACTERIOSTATIC SODIUM CHLORIDE 0.9 %
4 VIAL (ML) INJECTION EVERY 6 HOURS
Refills: 0 | Status: DISCONTINUED | OUTPATIENT
Start: 2025-01-01 | End: 2025-01-01

## 2025-01-01 RX ORDER — SODIUM CHLORIDE 9 G/ML
1000 INJECTION, SOLUTION INTRAVENOUS
Refills: 0 | Status: DISCONTINUED | OUTPATIENT
Start: 2025-01-01 | End: 2025-01-01

## 2025-01-01 RX ORDER — SODIUM BICARBONATE 42 MG/ML
0.22 INJECTION, SOLUTION INTRAVENOUS
Qty: 150 | Refills: 0 | Status: DISCONTINUED | OUTPATIENT
Start: 2025-01-01 | End: 2025-01-01

## 2025-01-01 RX ORDER — MAGNESIUM SULFATE 0.8 MEQ/ML
2 AMPUL (ML) INJECTION ONCE
Refills: 0 | Status: COMPLETED | OUTPATIENT
Start: 2025-01-01 | End: 2025-01-01

## 2025-01-01 RX ORDER — SOD PHOSPHATE,MONOBASIC-DIBAS 3MMOL/ML
15 VIAL (ML) INTRAVENOUS ONCE
Refills: 0 | Status: COMPLETED | OUTPATIENT
Start: 2025-01-01 | End: 2025-01-01

## 2025-01-01 RX ORDER — BUMETANIDE 2 MG/1
2 TABLET ORAL ONCE
Refills: 0 | Status: COMPLETED | OUTPATIENT
Start: 2025-01-01 | End: 2025-01-01

## 2025-01-01 RX ORDER — SODIUM CHLORIDE 9 G/ML
1000 INJECTION, SOLUTION INTRAVENOUS ONCE
Refills: 0 | Status: COMPLETED | OUTPATIENT
Start: 2025-01-01 | End: 2025-01-01

## 2025-01-01 RX ORDER — ALBUTEROL 90 MCG
2.5 AEROSOL REFILL (GRAM) INHALATION
Refills: 0 | Status: COMPLETED | OUTPATIENT
Start: 2025-01-01 | End: 2025-01-01

## 2025-01-01 RX ORDER — HEPARIN SODIUM,PORCINE 10000/ML
5000 VIAL (ML) INJECTION EVERY 12 HOURS
Refills: 0 | Status: DISCONTINUED | OUTPATIENT
Start: 2025-01-01 | End: 2025-01-01

## 2025-01-01 RX ORDER — MIDODRINE HYDROCHLORIDE 5 MG/1
20 TABLET ORAL EVERY 8 HOURS
Refills: 0 | Status: DISCONTINUED | OUTPATIENT
Start: 2025-01-01 | End: 2025-01-01

## 2025-01-01 RX ORDER — IPRATROPIUM BROMIDE AND ALBUTEROL SULFATE .5; 2.5 MG/3ML; MG/3ML
3 SOLUTION RESPIRATORY (INHALATION) ONCE
Refills: 0 | Status: COMPLETED | OUTPATIENT
Start: 2025-01-01 | End: 2025-01-01

## 2025-01-01 RX ORDER — IPRATROPIUM BROMIDE AND ALBUTEROL SULFATE .5; 2.5 MG/3ML; MG/3ML
3 SOLUTION RESPIRATORY (INHALATION) EVERY 6 HOURS
Refills: 0 | Status: DISCONTINUED | OUTPATIENT
Start: 2025-01-01 | End: 2025-01-01

## 2025-01-01 RX ORDER — ANTISEPTIC SURGICAL SCRUB 0.04 MG/ML
1 SOLUTION TOPICAL
Refills: 0 | Status: DISCONTINUED | OUTPATIENT
Start: 2025-01-01 | End: 2025-01-01

## 2025-01-01 RX ORDER — BACTERIOSTATIC SODIUM CHLORIDE 0.9 %
1000 VIAL (ML) INJECTION ONCE
Refills: 0 | Status: COMPLETED | OUTPATIENT
Start: 2025-01-01 | End: 2025-01-01

## 2025-01-01 RX ORDER — AMINO ACIDS 5 %/LYTES/DEX 25 % 5 %
1000 INTRAVENOUS SOLUTION INTRAVENOUS
Refills: 0 | Status: DISCONTINUED | OUTPATIENT
Start: 2025-01-01 | End: 2025-01-01

## 2025-01-01 RX ORDER — AZITHROMYCIN DIHYDRATE 500 MG/1
TABLET, FILM COATED ORAL
Refills: 0 | Status: DISCONTINUED | OUTPATIENT
Start: 2025-01-01 | End: 2025-01-01

## 2025-01-01 RX ORDER — PROPOFOL 10 MG/ML
5 INJECTION, EMULSION INTRAVENOUS
Qty: 500 | Refills: 0 | Status: DISCONTINUED | OUTPATIENT
Start: 2025-01-01 | End: 2025-01-01

## 2025-01-01 RX ORDER — ACETAMINOPHEN 160 MG/5ML
1000 SUSPENSION ORAL ONCE
Refills: 0 | Status: COMPLETED | OUTPATIENT
Start: 2025-01-01 | End: 2025-01-01

## 2025-01-01 RX ORDER — PHENYLEPHRINE HYDROCHLORIDE 10 MG/ML
0.3 INJECTION INTRAVENOUS
Qty: 40 | Refills: 0 | Status: DISCONTINUED | OUTPATIENT
Start: 2025-01-01 | End: 2025-01-01

## 2025-01-01 RX ORDER — AMIODARONE HYDROCHLORIDE 50 MG/ML
150 INJECTION, SOLUTION INTRAVENOUS ONCE
Refills: 0 | Status: COMPLETED | OUTPATIENT
Start: 2025-01-01 | End: 2025-01-01

## 2025-01-01 RX ORDER — PHENYLEPHRINE HYDROCHLORIDE 10 MG/ML
1 INJECTION INTRAVENOUS
Qty: 160 | Refills: 0 | Status: DISCONTINUED | OUTPATIENT
Start: 2025-01-01 | End: 2025-01-01

## 2025-01-01 RX ORDER — PROPOFOL 10 MG/ML
20 INJECTION, EMULSION INTRAVENOUS
Qty: 1000 | Refills: 0 | Status: DISCONTINUED | OUTPATIENT
Start: 2025-01-01 | End: 2025-01-01

## 2025-01-01 RX ORDER — SODIUM ZIRCONIUM CYCLOSILICATE 5 G/5G
10 POWDER, FOR SUSPENSION ORAL ONCE
Refills: 0 | Status: COMPLETED | OUTPATIENT
Start: 2025-01-01 | End: 2025-01-01

## 2025-01-01 RX ORDER — SODIUM ZIRCONIUM CYCLOSILICATE 5 G/5G
10 POWDER, FOR SUSPENSION ORAL EVERY 8 HOURS
Refills: 0 | Status: DISCONTINUED | OUTPATIENT
Start: 2025-01-01 | End: 2025-01-01

## 2025-01-01 RX ORDER — CALCIUM CHLORIDE 100 MG/ML
1000 INJECTION INTRAVENOUS; INTRAVENTRICULAR ONCE
Refills: 0 | Status: COMPLETED | OUTPATIENT
Start: 2025-01-01 | End: 2025-01-01

## 2025-01-01 RX ORDER — AZITHROMYCIN DIHYDRATE 500 MG/1
500 TABLET, FILM COATED ORAL EVERY 24 HOURS
Refills: 0 | Status: DISCONTINUED | OUTPATIENT
Start: 2025-01-01 | End: 2025-01-01

## 2025-01-01 RX ORDER — AZITHROMYCIN DIHYDRATE 500 MG/1
500 TABLET, FILM COATED ORAL ONCE
Refills: 0 | Status: COMPLETED | OUTPATIENT
Start: 2025-01-01 | End: 2025-01-01

## 2025-01-01 RX ORDER — SOD PHOSPHATE,MONOBASIC-DIBAS 3MMOL/ML
30 VIAL (ML) INTRAVENOUS ONCE
Refills: 0 | Status: COMPLETED | OUTPATIENT
Start: 2025-01-01 | End: 2025-01-01

## 2025-01-01 RX ORDER — NOREPINEPHRINE BITARTRATE 1 MG/ML
0.05 INJECTION, SOLUTION, CONCENTRATE INTRAVENOUS
Qty: 8 | Refills: 0 | Status: DISCONTINUED | OUTPATIENT
Start: 2025-01-01 | End: 2025-01-01

## 2025-01-01 RX ADMIN — Medication 4 MILLILITER(S): at 12:30

## 2025-01-01 RX ADMIN — PHENYLEPHRINE HYDROCHLORIDE 19 MICROGRAM(S)/KG/MIN: 10 INJECTION INTRAVENOUS at 22:46

## 2025-01-01 RX ADMIN — Medication 200 GRAM(S): at 15:59

## 2025-01-01 RX ADMIN — SODIUM BICARBONATE 50 MILLIEQUIVALENT(S): 42 INJECTION, SOLUTION INTRAVENOUS at 22:39

## 2025-01-01 RX ADMIN — PROPOFOL 10.1 MICROGRAM(S)/KG/MIN: 10 INJECTION, EMULSION INTRAVENOUS at 00:52

## 2025-01-01 RX ADMIN — Medication 50 MILLILITER(S): at 07:00

## 2025-01-01 RX ADMIN — ACETAMINOPHEN 400 MILLIGRAM(S): 160 SUSPENSION ORAL at 06:46

## 2025-01-01 RX ADMIN — Medication 50 MILLILITER(S): at 15:39

## 2025-01-01 RX ADMIN — Medication 200 GRAM(S): at 15:38

## 2025-01-01 RX ADMIN — CALCIUM CHLORIDE 1000 MILLIGRAM(S): 100 INJECTION INTRAVENOUS; INTRAVENTRICULAR at 22:49

## 2025-01-01 RX ADMIN — IPRATROPIUM BROMIDE AND ALBUTEROL SULFATE 3 MILLILITER(S): .5; 2.5 SOLUTION RESPIRATORY (INHALATION) at 11:22

## 2025-01-01 RX ADMIN — Medication 100 GRAM(S): at 00:22

## 2025-01-01 RX ADMIN — Medication 5000 UNIT(S): at 18:02

## 2025-01-01 RX ADMIN — Medication 5000 UNIT(S): at 05:43

## 2025-01-01 RX ADMIN — LEVETIRACETAM 420 MILLIGRAM(S): 750 TABLET, FILM COATED ORAL at 12:40

## 2025-01-01 RX ADMIN — PHENYLEPHRINE HYDROCHLORIDE 11.4 MICROGRAM(S)/KG/MIN: 10 INJECTION INTRAVENOUS at 10:07

## 2025-01-01 RX ADMIN — NOREPINEPHRINE BITARTRATE 7.87 MICROGRAM(S)/KG/MIN: 1 INJECTION, SOLUTION, CONCENTRATE INTRAVENOUS at 22:09

## 2025-01-01 RX ADMIN — Medication 4 MILLILITER(S): at 17:50

## 2025-01-01 RX ADMIN — SODIUM ZIRCONIUM CYCLOSILICATE 10 GRAM(S): 5 POWDER, FOR SUSPENSION ORAL at 22:39

## 2025-01-01 RX ADMIN — Medication 4 MILLILITER(S): at 17:21

## 2025-01-01 RX ADMIN — MIDODRINE HYDROCHLORIDE 20 MILLIGRAM(S): 5 TABLET ORAL at 23:01

## 2025-01-01 RX ADMIN — Medication 200 GRAM(S): at 08:37

## 2025-01-01 RX ADMIN — Medication 6 UNIT(S)/MIN: at 02:16

## 2025-01-01 RX ADMIN — Medication 2 MILLIGRAM(S): at 00:07

## 2025-01-01 RX ADMIN — NOREPINEPHRINE BITARTRATE 7.87 MICROGRAM(S)/KG/MIN: 1 INJECTION, SOLUTION, CONCENTRATE INTRAVENOUS at 00:52

## 2025-01-01 RX ADMIN — PHENYLEPHRINE HYDROCHLORIDE 11.4 MICROGRAM(S)/KG/MIN: 10 INJECTION INTRAVENOUS at 12:40

## 2025-01-01 RX ADMIN — SODIUM ZIRCONIUM CYCLOSILICATE 10 GRAM(S): 5 POWDER, FOR SUSPENSION ORAL at 15:37

## 2025-01-01 RX ADMIN — Medication 5000 UNIT(S): at 17:35

## 2025-01-01 RX ADMIN — AZITHROMYCIN DIHYDRATE 255 MILLIGRAM(S): 500 TABLET, FILM COATED ORAL at 17:36

## 2025-01-01 RX ADMIN — SODIUM BICARBONATE 150 MEQ/KG/HR: 42 INJECTION, SOLUTION INTRAVENOUS at 23:39

## 2025-01-01 RX ADMIN — IPRATROPIUM BROMIDE AND ALBUTEROL SULFATE 3 MILLILITER(S): .5; 2.5 SOLUTION RESPIRATORY (INHALATION) at 17:50

## 2025-01-01 RX ADMIN — MIDODRINE HYDROCHLORIDE 20 MILLIGRAM(S): 5 TABLET ORAL at 05:51

## 2025-01-01 RX ADMIN — Medication 5 UNIT(S): at 08:37

## 2025-01-01 RX ADMIN — LEVETIRACETAM 420 MILLIGRAM(S): 750 TABLET, FILM COATED ORAL at 12:52

## 2025-01-01 RX ADMIN — Medication 4 MILLILITER(S): at 11:22

## 2025-01-01 RX ADMIN — IPRATROPIUM BROMIDE AND ALBUTEROL SULFATE 3 MILLILITER(S): .5; 2.5 SOLUTION RESPIRATORY (INHALATION) at 04:58

## 2025-01-01 RX ADMIN — PROPOFOL 10.1 MICROGRAM(S)/KG/MIN: 10 INJECTION, EMULSION INTRAVENOUS at 06:25

## 2025-01-01 RX ADMIN — NOREPINEPHRINE BITARTRATE 7.87 MICROGRAM(S)/KG/MIN: 1 INJECTION, SOLUTION, CONCENTRATE INTRAVENOUS at 20:35

## 2025-01-01 RX ADMIN — Medication 4 MILLILITER(S): at 23:04

## 2025-01-01 RX ADMIN — Medication 2 MILLIGRAM(S): at 05:37

## 2025-01-01 RX ADMIN — IPRATROPIUM BROMIDE AND ALBUTEROL SULFATE 3 MILLILITER(S): .5; 2.5 SOLUTION RESPIRATORY (INHALATION) at 09:15

## 2025-01-01 RX ADMIN — MIDODRINE HYDROCHLORIDE 20 MILLIGRAM(S): 5 TABLET ORAL at 14:36

## 2025-01-01 RX ADMIN — ACETAMINOPHEN 1000 MILLIGRAM(S): 160 SUSPENSION ORAL at 07:16

## 2025-01-01 RX ADMIN — PHENYLEPHRINE HYDROCHLORIDE 11.4 MICROGRAM(S)/KG/MIN: 10 INJECTION INTRAVENOUS at 18:03

## 2025-01-01 RX ADMIN — Medication 2.5 MILLIGRAM(S): at 22:24

## 2025-01-01 RX ADMIN — Medication 50 MILLILITER(S): at 22:40

## 2025-01-01 RX ADMIN — BUMETANIDE 2 MILLIGRAM(S): 2 TABLET ORAL at 11:22

## 2025-01-01 RX ADMIN — Medication 4 MILLIGRAM(S): at 05:32

## 2025-01-01 RX ADMIN — Medication 5 UNIT(S): at 22:40

## 2025-01-01 RX ADMIN — Medication 100 GRAM(S): at 04:15

## 2025-01-01 RX ADMIN — Medication 4 MILLIGRAM(S): at 20:03

## 2025-01-01 RX ADMIN — Medication 5000 UNIT(S): at 17:23

## 2025-01-01 RX ADMIN — ANTISEPTIC SURGICAL SCRUB 15 MILLILITER(S): 0.04 SOLUTION TOPICAL at 17:36

## 2025-01-01 RX ADMIN — SODIUM BICARBONATE 50 MILLIEQUIVALENT(S): 42 INJECTION, SOLUTION INTRAVENOUS at 00:59

## 2025-01-01 RX ADMIN — Medication 5 UNIT(S): at 15:15

## 2025-01-01 RX ADMIN — ANTISEPTIC SURGICAL SCRUB 15 MILLILITER(S): 0.04 SOLUTION TOPICAL at 05:51

## 2025-01-01 RX ADMIN — SODIUM ZIRCONIUM CYCLOSILICATE 10 GRAM(S): 5 POWDER, FOR SUSPENSION ORAL at 05:51

## 2025-01-01 RX ADMIN — IPRATROPIUM BROMIDE AND ALBUTEROL SULFATE 3 MILLILITER(S): .5; 2.5 SOLUTION RESPIRATORY (INHALATION) at 23:12

## 2025-01-01 RX ADMIN — Medication 2 MILLIGRAM(S): at 01:52

## 2025-01-01 RX ADMIN — Medication 5 UNIT(S): at 00:59

## 2025-01-01 RX ADMIN — PROPOFOL 10.1 MICROGRAM(S)/KG/MIN: 10 INJECTION, EMULSION INTRAVENOUS at 20:50

## 2025-01-01 RX ADMIN — ACETAMINOPHEN 1000 MILLIGRAM(S): 160 SUSPENSION ORAL at 12:36

## 2025-01-01 RX ADMIN — IPRATROPIUM BROMIDE AND ALBUTEROL SULFATE 3 MILLILITER(S): .5; 2.5 SOLUTION RESPIRATORY (INHALATION) at 22:24

## 2025-01-01 RX ADMIN — Medication 50 MILLILITER(S): at 12:50

## 2025-01-01 RX ADMIN — Medication 62.5 MILLIMOLE(S): at 06:09

## 2025-01-01 RX ADMIN — Medication 2 MILLIGRAM(S): at 17:36

## 2025-01-01 RX ADMIN — SODIUM BICARBONATE 50 MILLIEQUIVALENT(S): 42 INJECTION, SOLUTION INTRAVENOUS at 23:40

## 2025-01-01 RX ADMIN — PANTOPRAZOLE 40 MILLIGRAM(S): 20 TABLET, DELAYED RELEASE ORAL at 20:21

## 2025-01-01 RX ADMIN — Medication 25 GRAM(S): at 00:18

## 2025-01-01 RX ADMIN — SODIUM BICARBONATE 150 MEQ/KG/HR: 42 INJECTION, SOLUTION INTRAVENOUS at 05:50

## 2025-01-01 RX ADMIN — Medication 5000 UNIT(S): at 05:51

## 2025-01-01 RX ADMIN — Medication 25 GRAM(S): at 05:37

## 2025-01-01 RX ADMIN — Medication 75 MILLILITER(S): at 19:29

## 2025-01-01 RX ADMIN — PHENYLEPHRINE HYDROCHLORIDE 11.4 MICROGRAM(S)/KG/MIN: 10 INJECTION INTRAVENOUS at 05:30

## 2025-01-01 RX ADMIN — Medication 2 MILLIGRAM(S): at 05:50

## 2025-01-01 RX ADMIN — Medication 5 UNIT(S): at 04:11

## 2025-01-01 RX ADMIN — LEVETIRACETAM 400 MILLIGRAM(S): 750 TABLET, FILM COATED ORAL at 22:49

## 2025-01-01 RX ADMIN — Medication 4 MILLIGRAM(S): at 08:11

## 2025-01-01 RX ADMIN — ANTISEPTIC SURGICAL SCRUB 1 APPLICATION(S): 0.04 SOLUTION TOPICAL at 05:51

## 2025-01-01 RX ADMIN — Medication 2.5 MILLIGRAM(S): at 22:26

## 2025-01-01 RX ADMIN — Medication 5000 UNIT(S): at 05:37

## 2025-01-01 RX ADMIN — SODIUM BICARBONATE 150 MEQ/KG/HR: 42 INJECTION, SOLUTION INTRAVENOUS at 17:24

## 2025-01-01 RX ADMIN — IPRATROPIUM BROMIDE AND ALBUTEROL SULFATE 3 MILLILITER(S): .5; 2.5 SOLUTION RESPIRATORY (INHALATION) at 23:09

## 2025-01-01 RX ADMIN — SODIUM BICARBONATE 50 MILLILITER(S): 42 INJECTION, SOLUTION INTRAVENOUS at 04:17

## 2025-01-01 RX ADMIN — Medication 50 MILLILITER(S): at 00:59

## 2025-01-01 RX ADMIN — Medication 75 MILLILITER(S): at 16:21

## 2025-01-01 RX ADMIN — ANTISEPTIC SURGICAL SCRUB 1 APPLICATION(S): 0.04 SOLUTION TOPICAL at 05:41

## 2025-01-01 RX ADMIN — Medication 100 GRAM(S): at 22:39

## 2025-01-01 RX ADMIN — Medication 6 UNIT(S)/MIN: at 19:29

## 2025-01-01 RX ADMIN — AMIODARONE HYDROCHLORIDE 150 MILLIGRAM(S): 50 INJECTION, SOLUTION INTRAVENOUS at 17:19

## 2025-01-01 RX ADMIN — Medication 85 MILLIMOLE(S): at 17:36

## 2025-01-01 RX ADMIN — Medication 2 MILLIGRAM(S): at 16:28

## 2025-01-01 RX ADMIN — Medication 4 MILLIGRAM(S): at 01:28

## 2025-01-01 RX ADMIN — ANTISEPTIC SURGICAL SCRUB 15 MILLILITER(S): 0.04 SOLUTION TOPICAL at 05:43

## 2025-01-01 RX ADMIN — Medication 2.5 MILLIGRAM(S): at 22:19

## 2025-01-01 RX ADMIN — ACETAMINOPHEN 400 MILLIGRAM(S): 160 SUSPENSION ORAL at 11:22

## 2025-01-01 RX ADMIN — Medication 2 MILLIGRAM(S): at 07:16

## 2025-01-01 RX ADMIN — ANTISEPTIC SURGICAL SCRUB 15 MILLILITER(S): 0.04 SOLUTION TOPICAL at 17:23

## 2025-01-01 RX ADMIN — MIDODRINE HYDROCHLORIDE 20 MILLIGRAM(S): 5 TABLET ORAL at 12:40

## 2025-01-01 RX ADMIN — IPRATROPIUM BROMIDE AND ALBUTEROL SULFATE 3 MILLILITER(S): .5; 2.5 SOLUTION RESPIRATORY (INHALATION) at 05:07

## 2025-01-01 RX ADMIN — PROPOFOL 10.1 MICROGRAM(S)/KG/MIN: 10 INJECTION, EMULSION INTRAVENOUS at 22:09

## 2025-01-01 RX ADMIN — PHENYLEPHRINE HYDROCHLORIDE 11.4 MICROGRAM(S)/KG/MIN: 10 INJECTION INTRAVENOUS at 20:56

## 2025-01-01 RX ADMIN — ANTISEPTIC SURGICAL SCRUB 15 MILLILITER(S): 0.04 SOLUTION TOPICAL at 05:37

## 2025-01-01 RX ADMIN — PHENYLEPHRINE HYDROCHLORIDE 11.4 MICROGRAM(S)/KG/MIN: 10 INJECTION INTRAVENOUS at 19:29

## 2025-01-01 RX ADMIN — IPRATROPIUM BROMIDE AND ALBUTEROL SULFATE 3 MILLILITER(S): .5; 2.5 SOLUTION RESPIRATORY (INHALATION) at 12:40

## 2025-01-01 RX ADMIN — IPRATROPIUM BROMIDE AND ALBUTEROL SULFATE 3 MILLILITER(S): .5; 2.5 SOLUTION RESPIRATORY (INHALATION) at 23:02

## 2025-01-01 RX ADMIN — Medication 50 MILLILITER(S): at 04:12

## 2025-01-01 RX ADMIN — ANTISEPTIC SURGICAL SCRUB 1 APPLICATION(S): 0.04 SOLUTION TOPICAL at 05:43

## 2025-01-01 RX ADMIN — ANTISEPTIC SURGICAL SCRUB 15 MILLILITER(S): 0.04 SOLUTION TOPICAL at 18:03

## 2025-01-01 RX ADMIN — Medication 1000 MILLILITER(S): at 22:49

## 2025-01-01 RX ADMIN — AZITHROMYCIN DIHYDRATE 255 MILLIGRAM(S): 500 TABLET, FILM COATED ORAL at 18:48

## 2025-01-01 RX ADMIN — IPRATROPIUM BROMIDE AND ALBUTEROL SULFATE 3 MILLILITER(S): .5; 2.5 SOLUTION RESPIRATORY (INHALATION) at 12:16

## 2025-01-01 RX ADMIN — MIDODRINE HYDROCHLORIDE 20 MILLIGRAM(S): 5 TABLET ORAL at 15:37

## 2025-01-01 RX ADMIN — SODIUM BICARBONATE 50 MILLIEQUIVALENT(S): 42 INJECTION, SOLUTION INTRAVENOUS at 08:37

## 2025-01-01 RX ADMIN — Medication 4 MILLILITER(S): at 05:01

## 2025-01-01 RX ADMIN — SODIUM CHLORIDE 1000 MILLILITER(S): 9 INJECTION, SOLUTION INTRAVENOUS at 03:00

## 2025-01-01 RX ADMIN — Medication 50 MILLILITER(S): at 14:52

## 2025-01-01 RX ADMIN — Medication 2 MILLIGRAM(S): at 20:50

## 2025-01-01 RX ADMIN — PHENYLEPHRINE HYDROCHLORIDE 11.4 MICROGRAM(S)/KG/MIN: 10 INJECTION INTRAVENOUS at 14:36

## 2025-01-01 RX ADMIN — AZITHROMYCIN DIHYDRATE 500 MILLIGRAM(S): 500 TABLET, FILM COATED ORAL at 19:48

## 2025-01-01 RX ADMIN — Medication 2 MILLIGRAM(S): at 22:43

## 2025-01-01 RX ADMIN — Medication 50 MILLILITER(S): at 08:56

## 2025-01-01 RX ADMIN — IPRATROPIUM BROMIDE AND ALBUTEROL SULFATE 3 MILLILITER(S): .5; 2.5 SOLUTION RESPIRATORY (INHALATION) at 17:20

## 2025-01-01 RX ADMIN — Medication 2 MILLIGRAM(S): at 20:53

## 2025-01-01 RX ADMIN — SODIUM BICARBONATE 100 MEQ/KG/HR: 42 INJECTION, SOLUTION INTRAVENOUS at 22:39

## 2025-01-19 NOTE — ED PROVIDER NOTE - SKIN, MLM
Skin normal color for race, warm, dry and intact. No evidence of rash. +psoriatic rash of the bilateral LE

## 2025-01-19 NOTE — ED ADULT TRIAGE NOTE - CHIEF COMPLAINT QUOTE
Patient BIB EMS post arrest from home intubated with 7.0 ET tube with 20 lip line, as per EMS patient received as a unresponsive in the home with labored breathing then went in cardiac arrest on the ambulance. Received Epix5, 1 Bicarb, 1 calcium chloride. Left tibial IO. PMx: DM, Asthma

## 2025-01-19 NOTE — ED ADULT NURSE NOTE - OBJECTIVE STATEMENT
Patient BIB EMS post arrest from home intubated with 7.0 ET tube with 20 lip line, As per EMS family stated that pt was not feeling earlier in the day and went to sleep. Later on pt was found  unresponsive in the home with labored breathing then went in cardiac arrest on the ambulance. Received Epix5, 1 Bicarb, 1 calcium chloride. Left tibial IO. PMx: DM, Asthma

## 2025-01-19 NOTE — H&P ADULT - NSHPLABSRESULTS_GEN_ALL_CORE
LABS:  cret                        13.1   14.60 )-----------( 152      ( 19 Jan 2025 17:40 )             45.3     01-19    138  |  98  |  25[H]  ----------------------------<  210[H]  4.4   |  28  |  1.74[H]    Ca    8.8      19 Jan 2025 17:40    TPro  7.1  /  Alb  2.3[L]  /  TBili  0.6  /  DBili  x   /  AST  1133[H]  /  ALT  995[H]  /  AlkPhos  128[H]  01-19

## 2025-01-19 NOTE — ED ADULT NURSE REASSESSMENT NOTE - NS ED NURSE REASSESS COMMENT FT1
Patient noted with jerking movements and decrease in BP, ICU PA at beside with orders made to place on norepinephrine.

## 2025-01-19 NOTE — H&P ADULT - HISTORY OF PRESENT ILLNESS
54 year old M with PMHx asthma presented to ED s/p cardiac arrest at home. 911 called, patient found agonal breathing and then cardiac arrest, CPR started immediately by EMS. ROSC obtained in ED after 5 epis. ~ 25 minute downtime. ICUconsulted for further evaluation. At bedside, niece present states that for past few days patient has been sick with URI and febrile. States today grandfather stated patient was ok at home. She is unclear on all his medical history but states patient is developmental delayed (not a part of opwdd). Will admit to ICU s/p cardiac arrest likely hypoxic arrest.

## 2025-01-19 NOTE — ED PROVIDER NOTE - CLINICAL SUMMARY MEDICAL DECISION MAKING FREE TEXT BOX
54 year-old male with history of diabetes, obesity, sepsis, pneumonia due to COVID, metabolic encephalopathy, asthma and acute respiratory failure with hypoxia presents today brought in by ambulance from home status postcardiac arrest. EMS report that pt c/o headache yesterday, fever @3am, pt was last seen normal at 12 noon by family, at 4:30pm pt found unresponsive, diaphoretic with agonal breathing. Pt did arrest with EMS, initially in asystole then in PEA, given a total of epi x 5, bicarb x1 and calcium x1 with ROSC. PT presented to the ER intubated, LLE IO in place 54 year-old male with history of diabetes, obesity, sepsis, pneumonia due to COVID, metabolic encephalopathy, asthma and acute respiratory failure with hypoxia presents today brought in by ambulance from home status postcardiac arrest. EMS report that pt c/o headache yesterday, fever @3am, pt was last seen normal at 12 noon by family, at 4:30pm pt found unresponsive, diaphoretic with agonal breathing. Pt did arrest with EMS, initially in asystole then in PEA, given a total of epi x 5, bicarb x1 and calcium x1 with ROSC. PT presented to the ER intubated, LLE IO in place, ETT placement verified with chest auscultation and visually with glidescope, pulses are faint however bedside sono used, +cardiac activity and pulses. Refer to  PE, DDx includes but not limited MI, cva/bleed, pna, chf. Labs ordered, sedation, ct, cxr, vent, abg. pt to be admitted to the icu

## 2025-01-19 NOTE — ED ADULT NURSE REASSESSMENT NOTE - NS ED NURSE REASSESS COMMENT FT1
Bedside change of shift report received from antonieta Nolen. Pt received intubated, 7.0 ETT secured at 20cm at the lip. ICU PAs Sukh at bedside. Pt noted to have jerking movements, ICU PAs aware. Pt currently hypotensive, IV levophed infusing as per ICU PA aNun's verbal order. IV sites remain in tact and WDL.

## 2025-01-19 NOTE — H&P ADULT - ASSESSMENT
54 year old M with PMHx asthma admitted to ICU s/p cardiac arrest.         # Neuro:  - poor neuro exam s/p cardiac arrest. see above exam.   - CT head. FU results   - off sedation currently     #Resp:  - intubated for cardiac arrest   - CXR: dense b/l consolidations RT> LT   - Currently on 100% fio2 and PEEP 10.   - CT chest. FU results.     #CV:  //s/p Cardiac Arrest - based of cxr and history of URI obtained from granddaughter possible hypoxic arrest from PNA   -HD stable without vasopressor requirements. MAP >65   - EKG no ST changes     #GI:  -Diet:NPO   - protonix     #Renal:  //MALENA s/p cardiac arrest   - avoid nephrotoxic agents   - kapadia, cont to monitor strict I/Os  - voiding freely, making adequate UO  - replete lytes as appropriate     #ID:  - wbc elevated.   - will start on empiric CAP coverage - Levaquin (documented history of allergy to penicillin - unclear reaction?) & azithromycin   - FU RVP, mycoplasma, sputum culture, blood culture, UA, legionella, MRSA, strep       #Heme:  -HSQ     #Endo:  - Q6H FS while NPO.       Ethics: niece and father at bedside at this time full code.      54 year old M with PMHx asthma admitted to ICU s/p cardiac arrest.         # Neuro:  - poor neuro exam s/p cardiac arrest. see above exam.   - CT head. FU results   - off sedation currently     #Resp:  - intubated for cardiac arrest   - CXR: dense b/l consolidations RT> LT   - Currently on 100% fio2 and PEEP 10.   - CT chest. FU results.     #CV:  //s/p Cardiac Arrest - based of cxr and history of URI obtained from granddaughter possible hypoxic arrest from PNA   -HD stable without vasopressor requirements. MAP >65   - EKG no ST changes     #GI:  -Diet:NPO   - protonix     #Renal:  //MALENA s/p cardiac arrest   - avoid nephrotoxic agents   - kapadia, cont to monitor strict I/Os  - voiding freely, making adequate UO  - replete lytes as appropriate     #ID:  - wbc elevated.   - will start on empiric CAP coverage - Levaquin (documented history of allergy to penicillin - unclear reaction?) & azithromycin   - FU RVP, mycoplasma, sputum culture, blood culture, UA, legionella, MRSA, strep       #Heme:  -HSQ     #Endo:  - Q6H FS while NPO.       Ethics: niece and father at bedside at this time full code.     d/w eicu attending dr dioni palafox spent 50 minutes assessing presenting problems of acute illness, which pose high probability of life threatening deterioration or end organ damage/dysfunction, as well as medical decision making including initiating plan of care, reviewing data, reviewing radiologic exams, discussing with multidisciplinary team,  discussing goals of care with patient/family, and writing this note.  Non-inclusive of procedures performed,

## 2025-01-19 NOTE — ED PROVIDER NOTE - CRITICAL CARE ATTENDING CONTRIBUTION TO CARE
54 year-old male with history of diabetes, obesity, sepsis, pneumonia due to COVID, metabolic encephalopathy, asthma and acute respiratory failure with hypoxia presents today brought in by ambulance from home status postcardiac arrest. EMS report that pt c/o headache yesterday, fever @3am, pt was last seen normal at 12 noon by family, at 4:30pm pt found unresponsive, diaphoretic with agonal breathing. Pt did arrest with EMS, initially in asystole then in PEA, given a total of epi x 5, bicarb x1 and calcium x1 with ROSC. PT presented to the ER intubated, LLE IO in place

## 2025-01-19 NOTE — H&P ADULT - NSHPPHYSICALEXAM_GEN_ALL_CORE
GENERAL: NAD, lying in intubated, no sedation  HEAD:  Atraumatic, normocephalic  EYES: pupils fixed & dilated   NECK: Supple, trachea midline, no JVD  HEART: tachycardic, regular rhythm, no murmurs, rubs, or gallops  LUNGS: intubated.  Mechanical BS bilaterally.   ABDOMEN: Soft, nontender, nondistended,   EXTREMITIES: No clubbing, cyanosis, or edema   NERVOUS SYSTEM:  overbreathing ventilator, no gag/cough, pupils fixed and dilated; no sedation; does not respond to pain  SKIN: areas of dry skin to b/l LE

## 2025-01-19 NOTE — ED ADULT NURSE NOTE - ED STAT RN HANDOFF DETAILS
Report given ICU RN Margarita. Report endorsed to oncoming RN. Safety checks compld this shift/Safety rounds completed hourly.  IV sites checked Q2+remains WDL. Meds given as ord with no s/s of adverse RXNs. Fall +skin precs in place. Any issues endorsed to oncoming RN for follow up. Levophed running @ 0.9mcg/kg/min. Proprofol infusing at 20 mcg/kg/min.

## 2025-01-19 NOTE — ED PROVIDER NOTE - PROGRESS NOTE DETAILS
pts family called, spoke to pt's brother Rohini Cameron who reports that pt lives downstairs in his mothers home, his mother through translation (speaks Irish) able to provide more information, pt was called down to join them to eat at 2:30, pt did say he was coming, by 3pm his daughter went downstairs and found him on the bed unresponsive, pt  has had cold symptoms x 1 day pt started on levophed for hypotension and propofol for sedation, experiencing jerks at the bedside

## 2025-01-20 PROBLEM — R73.03 PREDIABETES: Chronic | Status: ACTIVE | Noted: 2022-12-18

## 2025-01-20 NOTE — CHART NOTE - NSCHARTNOTEFT_GEN_A_CORE
:  YOVANI Doyle dr    Indication:  HRF    PROCEDURE:  [x ] LIMITED ECHO  [x ] LIMITED CHEST  [ ] LIMITED RETROPERITONEAL  [ ] LIMITED ABDOMINAL  [ ] LIMITED DVT  [ ] NEEDLE GUIDANCE VASCULAR  [ ] NEEDLE GUIDANCE THORACENTESIS  [ ] NEEDLE GUIDANCE PARACENTESIS  [ ] NEEDLE GUIDANCE PERICARDIOCENTESIS  [ ] OTHER    FINDINGS:  Chest: diffuse B lines bilat R>L with RLL consolidation    ECHO: technically limited study with LV>RV with grossly normal LV systolic function   No pericardial effusion  IVC: plethoric    INTERPRETATION:  lung exam with interstitial pattern with RLL consolidation  technically limited TTE but with grossly nl LV function    will diurese      images uploaded to [employer]

## 2025-01-20 NOTE — PROGRESS NOTE ADULT - ASSESSMENT
54 year old M with PMHx asthma presented to ED s/p cardiac arrest at home. 911 called, patient found agonal breathing and then cardiac arrest, CPR started immediately by EMS. ROSC obtained in ED after 5 epis. ~ 25 minute downtime. Suspect some level of anoxia / anoxic brain injury.    Take off sedation. eval mental status  keep on full vent support. Would give 1 dose Bumex 2mg.   HD stable.  NGT. GI PPX. TF.  strict I/O's. minimal UO for now. likely ARF from cardiac arrest.  abx for pna.  dvt ppx  poor prognosis. would repeat CTH once fully off sedation. EEG.

## 2025-01-21 NOTE — PROCEDURE NOTE - NSINDICATIONS_GEN_A_CORE
critical patient/monitoring purposes
critical illness/emergency venous access
arterial puncture to obtain ABG's/critical patient/monitoring purposes

## 2025-01-21 NOTE — PROGRESS NOTE ADULT - ASSESSMENT
54 year old M with PMHx asthma presented to ED s/p cardiac arrest at home. 911 called, patient found agonal breathing and then cardiac arrest, CPR started immediately by EMS. ROSC obtained in ED after 5 epis. ~ 25 minute downtime. Suspect some level of anoxia / anoxic brain injury.    awaiting EEG.  keep on full vent support.   now on phenylepherine. c/w midodrine.  NGT. GI PPX. TF.  strict I/O's. minimal UO for now. likely ARF from cardiac arrest. likely not candidate for any RRT.  abx for pna.  dvt ppx  poor prognosis. would repeat CTH once fully off sedation. EEG pending.

## 2025-01-21 NOTE — CONSULT NOTE ADULT - ASSESSMENT
54 year old M with PMHx asthma presented to ED s/p cardiac arrest at home. ROSC after ~ 25 minute downtime.  Neurology called for myoclonus  On exam no purposeful movement, pupils fixed, no corneals, no gag  initial CTh read as wnl     suspect severe anoxic injury with myoclonus,   acute renal failure, shock liver    GOC  routine EEG  If myoclonus persists can start Keppra 500 mg daily- renally dosed  prognosis guarded

## 2025-01-21 NOTE — PROCEDURE NOTE - NSINFORMCONSENT_GEN_A_CORE
Benefits, risks, and possible complications of procedure explained to patient/caregiver who verbalized understanding and gave written consent.
Benefits, risks, and possible complications of procedure explained to patient/caregiver who verbalized understanding and gave verbal consent.
via Dr. Olvera/Benefits, risks, and possible complications of procedure explained to patient/caregiver who verbalized understanding and gave verbal consent.

## 2025-01-21 NOTE — PROVIDER CONTACT NOTE (HYPOGLYCEMIA EVENT) - NS PROVIDER CONTACT BACKGROUND-HYPO
Age: 54y    Gender: Male    POCT Blood Glucose:  125 mg/dL (01-21-25 @ 07:14)  55 mg/dL (01-21-25 @ 06:53)        eMAR:dextrose 50% Injectable   50 milliLiter(s) IV Push (01-21-25 @ 07:00)

## 2025-01-21 NOTE — PROCEDURE NOTE - NSPROCDETAILS_GEN_ALL_CORE
location identified, draped/prepped, sterile technique used, needle inserted/introduced/positive blood return obtained via catheter/connected to a pressurized flush line/sutured in place/Seldinger technique/all materials/supplies accounted for at end of procedure
guidewire recovered/lumen(s) aspirated and flushed/sterile dressing applied/sterile technique, catheter placed/ultrasound guidance with use of sterile gel and probe cove
location identified, draped/prepped, sterile technique used, needle inserted/introduced/positive blood return obtained via catheter/connected to a pressurized flush line/sutured in place/hemostasis with direct pressure, dressing applied/Seldinger technique/all materials/supplies accounted for at end of procedure

## 2025-01-21 NOTE — PROCEDURE NOTE - NSCVLATTEMPTSITEVASC_A_CORE
Findings consistent with right elbow lateral epicondylitis  I reviewed patient's right elbow x-ray  I discussed prognosis of her elbow condition  Treat this conservatively with tennis elbow strap, custom volar wrist splint to wear made by OT and start OT/PT for lateral epicondylitis  When lifting or carrying items lift with palms up  Discussed this condition can take months to a year to resolve  She can use NSAIDs, OTC voltaren gel  See back in 6 weeks  All patient's questions were answered to her satisfaction  This note is created using dictation transcription  It may contain typographical errors, grammatical errors, improperly dictated words, background noise and other errors  left/internal jugular

## 2025-01-21 NOTE — CONSULT NOTE ADULT - SUBJECTIVE AND OBJECTIVE BOX
Neurology consult    ANDREAS [id number]     Patient is a 54y old  Male who presents with a chief complaint of s/p Cardiac arrest (21 Jan 2025 05:53)      HPI:  54 year old M with PMHx asthma presented to ED s/p cardiac arrest at home. 911 called, patient found agonal breathing and then cardiac arrest, CPR started immediately by EMS. ROSC obtained in ED after 5 epis. ~ 25 minute downtime. ICUconsulted for further evaluation. At bedside, niece present states that for past few days patient has been sick with URI and febrile. States today grandfather stated patient was ok at home. She is unclear on all his medical history but states patient is developmental delayed (not a part of opwdd). Will admit to ICU s/p cardiac arrest likely hypoxic arrest.  (19 Jan 2025 18:23)      eczema    MEDICATIONS    albuterol/ipratropium for Nebulization 3 milliLiter(s) Nebulizer every 6 hours  azithromycin  IVPB      azithromycin  IVPB 500 milliGRAM(s) IV Intermittent every 24 hours  chlorhexidine 0.12% Liquid 15 milliLiter(s) Oral Mucosa every 12 hours  chlorhexidine 2% Cloths 1 Application(s) Topical <User Schedule>  heparin   Injectable 5000 Unit(s) SubCutaneous every 12 hours  levoFLOXacin IVPB 500 milliGRAM(s) IV Intermittent every 24 hours  levoFLOXacin IVPB      midazolam Injectable 4 milliGRAM(s) IV Push every 2 hours PRN  phenylephrine    Infusion 0.3 MICROgram(s)/kG/Min IV Continuous <Continuous>      PMH: Prediabetes    Asthma         PSH: No significant past surgical history        Family history: No history of dementia, strokes, or seizures   FAMILY HISTORY:  No pertinent family history in first degree relatives        SOCIAL HISTORY:  No history of tobacco or alcohol use     Allergies    penicillin (Unknown)    Intolerances            Vital Signs Last 24 Hrs  T(C): 39.1 (21 Jan 2025 06:39), Max: 40.6 (20 Jan 2025 12:00)  T(F): 102.3 (21 Jan 2025 06:39), Max: 105 (20 Jan 2025 12:00)  HR: 136 (21 Jan 2025 10:00) (98 - 136)  BP: 81/56 (21 Jan 2025 10:00) (77/47 - 196/172)  BP(mean): 65 (21 Jan 2025 10:00) (53 - 180)  RR: 24 (21 Jan 2025 10:00) (19 - 32)  SpO2: 98% (21 Jan 2025 10:00) (85% - 100%)    Parameters below as of 21 Jan 2025 06:32  Patient On (Oxygen Delivery Method): ventilator          On Neurological Examination:    Mental Status - eyes closed no response to noxious stimuli no purposeful moevemnt    Cranial Nerves - eyes 5 mm fixed, no conreals, no gag, eyes midline with dolls eyes    Motor Exam -   no purposeful movements    Coord: deferred  Reflexes:          absent         LABS:  CBC Full  -  ( 21 Jan 2025 03:15 )  WBC Count : 19.13 K/uL  RBC Count : 5.12 M/uL  Hemoglobin : 14.0 g/dL  Hematocrit : 43.9 %  Platelet Count - Automated : 126 K/uL  Mean Cell Volume : 85.7 fl  Mean Cell Hemoglobin : 27.3 pg  Mean Cell Hemoglobin Concentration : 31.9 g/dL  Auto Neutrophil # : 16.66 K/uL  Auto Lymphocyte # : 1.22 K/uL  Auto Monocyte # : 0.94 K/uL  Auto Eosinophil # : 0.05 K/uL  Auto Basophil # : 0.07 K/uL  Auto Neutrophil % : 87.0 %  Auto Lymphocyte % : 6.4 %  Auto Monocyte % : 4.9 %  Auto Eosinophil % : 0.3 %  Auto Basophil % : 0.4 %    Urinalysis Basic - ( 21 Jan 2025 03:15 )    Color: x / Appearance: x / SG: x / pH: x  Gluc: 106 mg/dL / Ketone: x  / Bili: x / Urobili: x   Blood: x / Protein: x / Nitrite: x   Leuk Esterase: x / RBC: x / WBC x   Sq Epi: x / Non Sq Epi: x / Bacteria: x      01-21    135  |  95[L]  |  69[H]  ----------------------------<  106[H]  4.5   |  27  |  5.53[H]    Ca    7.6[L]      21 Jan 2025 03:15  Phos  8.4     01-21  Mg     2.2     01-21    TPro  7.2  /  Alb  2.2[L]  /  TBili  1.7[H]  /  DBili  x   /  AST  3203[H]  /  ALT  4199[H]  /  AlkPhos  136[H]  01-21    LIVER FUNCTIONS - ( 21 Jan 2025 03:15 )  Alb: 2.2 g/dL / Pro: 7.2 gm/dL / ALK PHOS: 136 U/L / ALT: 4199 U/L / AST: 3203 U/L / GGT: x           Hemoglobin A1C:       PT/INR - ( 19 Jan 2025 17:40 )   PT: 17.1 sec;   INR: 1.48 ratio         PTT - ( 19 Jan 2025 17:40 )  PTT:32.2 sec      RADIOLOGY  < from: CT Head No Cont (01.19.25 @ 21:29) >  IMPRESSION: No evidence of an acute transcortical infarction or   hemorrhage.    --- End of Report ---    < end of copied text >

## 2025-01-21 NOTE — PHARMACOTHERAPY INTERVENTION NOTE - COMMENTS
Per policy, levofloxacin IV dose/frequency adjusted to 500 mg IV q48h based on current renal function, for treatment of CAP. Azithromycin not needed as provides no additional coverage. Per policy, levofloxacin IV dose/frequency adjusted to 500 mg IV q48h based on current renal function, for treatment of CAP.

## 2025-01-21 NOTE — PROGRESS NOTE ADULT - ASSESSMENT
54 year old male s/p out of hospital cardiac arrest with ROSC obtained after 25 mins of CPR now developing shock state requiring IV pressors to maintain MAP     1)Acute hypoxic respiratory failure  2) shock state possible septic   3) PNA  4) cardiac arrest   5)acute renal failure  6) shock liver     Plan   -hold sedation infusion to evaluate mental status post arrest   myoclonic movements persist despite Keppra and prn versed  -Patient currently on Full vent support  -titrate settings to maintain SaO2 >90%, or pH >7.25  -consider low titdal volume ventilation strategy w/ goal Tv 4-6 cc/kg of ideal body weight  -plateu pressure goal <30  -Peridex oral care and VAP prophylaxis with HOB 30 degrees   -aggressive chest PT and suctioning   -daily sedation vacation with spontaneous breathing trial if clinical condition warrants, discuss with respiratory therapy   -patient currently on phenylephrine due to tachycardia , will titrate for MAP 65-70  -repeat lactate  -blood/urine/sputum cultures, then initiate broad spectrum antibiotics  -follow cultures and narrow antibiotics as able  -goal UOP >0.5 cc/kg/hr  -procalcitonin to help with infcetious process as source for sepsis/shock   - continue broad spectrum coverage for PNA  - Follow up sputum cultre the narrow specrtum based on culture  sensitivities   - For acute renal failure hold nephrotoxic meds, continue  hydration, trend urine output and follow BUN/Creatinine    Critical Care time: 35 mins assessing presenting problems of acute illness that poses high probability of life threatening deterioration or end organ damage/dysfunction.  Medical decision making inculding Initiating plan of care, reviewing data, reviewing radiology,direct patient bedside evaluation and interpretation of vital signs, any necessary ventilator management , discusion with multidisciplinary team, discussing goals of care with patient/family, all non inclusive of procedures, COVID 19 specific considerations and therapeutic  options based on the available and rapidly changing literature. Date of entry of this note is equal to the date of services rendered      54 year old male s/p out of hospital cardiac arrest with ROSC obtained after 25 mins of CPR now developing shock state requiring IV pressors to maintain MAP     1)Acute hypoxic respiratory failure  2) shock state possible septic secondary to PNA vs Cardiogenic post cardiac arrest   3) PNA  4) cardiac arrest   5) acute renal failure  6) shock liver     Plan   -hold sedation infusion to evaluate mental status post arrest   myoclonic movements persist despite Keppra and prn versed  -Patient currently on Full vent support  -titrate settings to maintain SaO2 >90%, or pH >7.25  -consider low titdal volume ventilation strategy w/ goal Tv 4-6 cc/kg of ideal body weight  -plateu pressure goal <30  -Peridex oral care and VAP prophylaxis with HOB 30 degrees   -aggressive chest PT and suctioning   -daily sedation vacation with spontaneous breathing trial if clinical condition warrants, discuss with respiratory therapy   -patient currently on phenylephrine for shock state in place of levophed due to tachycardia , will titrate for MAP 65-70  -repeat lactate to investigate sepsis as a source shock given rise in WBC and presence of PNA   -blood/urine/sputum cultures, then initiate broad spectrum antibiotics  -follow cultures and narrow antibiotics as able  -goal UOP >0.5 cc/kg/hr  -procalcitonin to help with infcetious process as source for sepsis/shock   - continue broad spectrum coverage for PNA  - Follow up sputum cultre the narrow specrtum based on culture  sensitivities   - For acute renal failure hold nephrotoxic meds, continue  hydration, trend urine output and follow BUN/Creatinine    Critical Care time: 35 mins assessing presenting problems of acute illness that poses high probability of life threatening deterioration or end organ damage/dysfunction.  Medical decision making inculding Initiating plan of care, reviewing data, reviewing radiology,direct patient bedside evaluation and interpretation of vital signs, any necessary ventilator management , discusion with multidisciplinary team, discussing goals of care with patient/family, all non inclusive of procedures, COVID 19 specific considerations and therapeutic  options based on the available and rapidly changing literature. Date of entry of this note is equal to the date of services rendered

## 2025-01-22 NOTE — EEG REPORT - NS EEG TEXT BOX
ANDREAS SMITH [id number] 54y (1970)M  Admitting MD: Dr. Logan Olvera    Study Date: 01-22-25  Study Duration: 25 min    --------------------------------------------------------------------------------------------------  History:  CC/ HPI Patient is a 54y old  Male who presents with a chief complaint of s/p Cardiac arrest (22 Jan 2025 08:47)    albuterol/ipratropium for Nebulization 3 milliLiter(s) Nebulizer every 6 hours  heparin   Injectable 5000 Unit(s) SubCutaneous every 12 hours  levETIRAcetam  IVPB 500 milliGRAM(s) IV Intermittent daily  levoFLOXacin IVPB 500 milliGRAM(s) IV Intermittent every 48 hours  midodrine 20 milliGRAM(s) Oral every 8 hours  phenylephrine    Infusion 1 MICROgram(s)/kG/Min IV Continuous <Continuous>  sodium bicarbonate  Infusion 0.222 mEq/kG/Hr IV Continuous <Continuous>  sodium zirconium cyclosilicate 10 Gram(s) Oral every 8 hours  vasopressin Infusion 0.04 Unit(s)/Min IV Continuous <Continuous>    --------------------------------------------------------------------------------------------------  Study Interpretation:    [[[Abbreviation Key:  PDR=alpha rhythm/posterior dominant rhythm. A-P=anterior posterior gradient.  Amplitude: ‘very low’:<20; ‘low’:20-50; ‘medium’:; ‘high’:>200uV.  Persistence for periodic/rhythmic patterns (% of epoch) ‘rare’:<1%; ‘occasional’:1-10%; ‘frequent’:10-50%; ‘abundant’:50-90%; ‘continuous’:>90%.  Persistence for sporadic discharges: ‘rare’:<1/hr; ‘occasional’:1/min-1/hr; ‘frequent’:>1/min; ‘abundant’:>1/10 sec.  GRDA=generalized rhythmic delta activity; FIRDA=frontal intermittent GRDA; LRDA=lateralized rhythmic delta activity; TIRDA=temporal intermittent rhythmic delta activity;  LPD=PLED=lateralized periodic discharges; GPD=generalized periodic discharges; BiPDs=BiPLEDs=bilateral independent periodic epileptiform discharges; SIRPID=stimulus induced rhythmic, periodic, or ictal appearing discharges; BIRDs=brief potentially ictal rhythmic discharges >4 Hz, lasting .5-10s; PFA (paroxysmal bursts >13 Hz or =8 Hz).  Modifiers: +F=with fast component; +S=with spike component; +R=with rhythmic component.  S-B=burst suppression pattern.  Max=maximal. N1-drowsy; N2-stage II sleep; N3-slow wave sleep. SSS/BETS=small sharp spikes/benign epileptiform transients of sleep. HV=hyperventilation; PS=photic stimulation]]]    FINDINGS:      Background:  Continuity: Suppressed  Symmetry: Symmetric  PDR: No PDR seen    Reactivity: Absent  Voltage: Diffusely suppressed (<10uV)  Anterior Posterior Gradient: Absent  Other background findings: None  Breach: Absent    Background Slowing:  Generalized slowing: As above  Focal slowing: None    State Changes:  Absent    Sporadic Epileptiform Discharges:    None    Rhythmic and Periodic Patterns (RPPs):  None    Electrographic and Electroclinical seizures:  None    Other Clinical Events:  None    Activation Procedures:  -Hyperventilation was not performed.    -Photic stimulation was performed and did not elicit any abnormalities.    Artifacts:  Intermittent myogenic and movement artifacts were noted.    ECG:  The heart rate on single channel ECG was predominantly between 70 - 80 BPM.    EEG Classification / Summary:  Abnormal EEG study in a comatose patient  -Voltage attenuation/suppression    -----------------------------------------------------------------------------------------------------    Clinical Impression:  Abnormal EEG study  Severe diffuse/multi-focal cerebral dysfunction, not specific as to etiology.  There were no epileptiform abnormalities or seizures recorded.      This is a preliminary impression still pending attendings attestation.     -------------------------------------------------------------------------------------------------------  EEG reading room: [phone]    After-hours epilepsy service: [phone]    Cam Bennett DO  Epilepsy Fellow     ANDREAS SMITH [id number] 54y (1970)M  Admitting MD: Dr. Logan Olvera    Study Date: 01-22-25  Study Duration: 25 min    --------------------------------------------------------------------------------------------------  History:  CC/ HPI Patient is a 54y old  Male who presents with a chief complaint of s/p Cardiac arrest (22 Jan 2025 08:47)    albuterol/ipratropium for Nebulization 3 milliLiter(s) Nebulizer every 6 hours  heparin   Injectable 5000 Unit(s) SubCutaneous every 12 hours  levETIRAcetam  IVPB 500 milliGRAM(s) IV Intermittent daily  levoFLOXacin IVPB 500 milliGRAM(s) IV Intermittent every 48 hours  midodrine 20 milliGRAM(s) Oral every 8 hours  phenylephrine    Infusion 1 MICROgram(s)/kG/Min IV Continuous <Continuous>  sodium bicarbonate  Infusion 0.222 mEq/kG/Hr IV Continuous <Continuous>  sodium zirconium cyclosilicate 10 Gram(s) Oral every 8 hours  vasopressin Infusion 0.04 Unit(s)/Min IV Continuous <Continuous>    --------------------------------------------------------------------------------------------------  Study Interpretation:    [[[Abbreviation Key:  PDR=alpha rhythm/posterior dominant rhythm. A-P=anterior posterior gradient.  Amplitude: ‘very low’:<20; ‘low’:20-50; ‘medium’:; ‘high’:>200uV.  Persistence for periodic/rhythmic patterns (% of epoch) ‘rare’:<1%; ‘occasional’:1-10%; ‘frequent’:10-50%; ‘abundant’:50-90%; ‘continuous’:>90%.  Persistence for sporadic discharges: ‘rare’:<1/hr; ‘occasional’:1/min-1/hr; ‘frequent’:>1/min; ‘abundant’:>1/10 sec.  GRDA=generalized rhythmic delta activity; FIRDA=frontal intermittent GRDA; LRDA=lateralized rhythmic delta activity; TIRDA=temporal intermittent rhythmic delta activity;  LPD=PLED=lateralized periodic discharges; GPD=generalized periodic discharges; BiPDs=BiPLEDs=bilateral independent periodic epileptiform discharges; SIRPID=stimulus induced rhythmic, periodic, or ictal appearing discharges; BIRDs=brief potentially ictal rhythmic discharges >4 Hz, lasting .5-10s; PFA (paroxysmal bursts >13 Hz or =8 Hz).  Modifiers: +F=with fast component; +S=with spike component; +R=with rhythmic component.  S-B=burst suppression pattern.  Max=maximal. N1-drowsy; N2-stage II sleep; N3-slow wave sleep. SSS/BETS=small sharp spikes/benign epileptiform transients of sleep. HV=hyperventilation; PS=photic stimulation]]]    FINDINGS:      Background:  Continuity: Suppressed  Symmetry: Symmetric  PDR: No PDR seen    Reactivity: Absent  Voltage: Diffusely suppressed (<10uV)  Anterior Posterior Gradient: Absent  Other background findings: None  Breach: Absent    Background Slowing:  Generalized slowing: As above  Focal slowing: None    State Changes:  Absent    Sporadic Epileptiform Discharges:    None    Rhythmic and Periodic Patterns (RPPs):  None    Electrographic and Electroclinical seizures:  None    Other Clinical Events:  None    Activation Procedures:  -Hyperventilation was not performed.    -Photic stimulation was performed and did not elicit any abnormalities.    Artifacts:  Intermittent myogenic and movement artifacts were noted.    ECG:  The heart rate on single channel ECG was predominantly between 70 - 80 BPM.    EEG Classification / Summary:  Abnormal EEG study in a comatose patient  -Generalized background suppression    -----------------------------------------------------------------------------------------------------    Clinical Impression:  Abnormal EEG study  Severe diffuse/multi-focal cerebral dysfunction, not specific as to etiology.  There were no epileptiform abnormalities or seizures recorded.      -------------------------------------------------------------------------------------------------------  EEG reading room: [phone]    After-hours epilepsy service: [phone]    Cam Bennett DO  Epilepsy Fellow    Charlie Granados MD  Neurology Attending Physician

## 2025-01-22 NOTE — PROGRESS NOTE ADULT - SUBJECTIVE AND OBJECTIVE BOX
54y  Male  penicillin (Unknown)    CC: Patient is a 54y old  Male who presents with a chief complaint of s/p Cardiac arrest      HPI:   54 year old M with PMHx asthma presented to ED s/p cardiac arrest at home. 911 called, patient found agonal breathing and then cardiac arrest, CPR started immediately by EMS. ROSC obtained in ED after 5 epis. ~ 25 minute downtime. At bedside, niece present states that for past few days patient has been sick with URI and febrile. States today grandfather stated patient was ok at home. She is unclear on all his medical history but states patient is developmental delayed Pt admitted to ICU with signs of anoxia / anoxic brain injury including myoclonic movements neruologly he has no gag or corneal reflexes , no response to pain and pupils are fixed. Tonight he  began drop his blood pressure consistently with maps in the high 50's and tachycardic with heart rate in he low 100's, IV pressors were started to maintain MAP > 65      PAST MEDICAL & SURGICAL HISTORY:  Prediabetes  Asthma    No significant past surgical history      FAMILY HISTORY:  No pertinent family history in first degree relatives      Vital Signs Last 24 Hrs  T(C): 38.1 (21 Jan 2025 04:00), Max: 40.6 (20 Jan 2025 12:00)  T(F): 100.6 (21 Jan 2025 04:00), Max: 105 (20 Jan 2025 12:00)  HR: 101 (21 Jan 2025 05:23) (98 - 137)  BP: 155/134 (21 Jan 2025 05:23) (77/57 - 155/134)  BP(mean): 142 (21 Jan 2025 05:23) (64 - 142)  RR: 25 (21 Jan 2025 05:23) (21 - 29)  SpO2: 93% (21 Jan 2025 05:23) (81% - 100%)    Parameters below as of 20 Jan 2025 23:12  Patient On (Oxygen Delivery Method): ventilator    ABG - ( 20 Jan 2025 22:24 )  pH, Arterial: 7.37  pH, Blood: x     /  pCO2: 50    /  pO2: 79    / HCO3: 29    / Base Excess: 2.4   /  SaO2: 95.6        I&O's Summary  19 Jan 2025 07:01  -  20 Jan 2025 07:00  --------------------------------------------------------  IN: 373.4 mL / OUT: 80 mL / NET: 293.4 mL    20 Jan 2025 07:01  -  21 Jan 2025 05:53  --------------------------------------------------------  IN: 881.2 mL / OUT: 0 mL / NET: 881.2 mL      LABS  01-21  135  |  95[L]  |  69[H]  ----------------------------<  106[H]  4.5   |  27  |  5.53[H]  Ca    7.6[L]      21 Jan 2025 03:15  Phos  8.4     01-21  Mg     2.2     01-21  TPro  7.2  /  Alb  2.2[L]  /  TBili  1.7[H]  /  DBili  x   /  AST  3203[H]  /  ALT  4199[H]  /  AlkPhos  136[H]  01-21                          14.0   19.13 )-----------( 126      ( 21 Jan 2025 03:15 )             43.9   PT/INR - ( 19 Jan 2025 17:40 )   PT: 17.1 sec;   INR: 1.48 ratio     PTT - ( 19 Jan 2025 17:40 )  PTT:32.2 sec  CARDIAC MARKERS ( 19 Jan 2025 17:40 )  x     / x     / x     / x     / <1.0 ng/mL      LIVER FUNCTIONS - ( 21 Jan 2025 03:15 )  Alb: 2.2 g/dL / Pro: 7.2 gm/dL / ALK PHOS: 136 U/L / ALT: 4199 U/L / AST: 3203 U/L / GGT: x           CAPILLARY BLOOD GLUCOSE  POCT Blood Glucose.: 111 mg/dL (20 Jan 2025 17:40)    Urinalysis Basic - ( 21 Jan 2025 03:15 )  Color: x / Appearance: x / SG: x / pH: x  Gluc: 106 mg/dL / Ketone: x  / Bili: x / Urobili: x   Blood: x / Protein: x / Nitrite: x   Leuk Esterase: x / RBC: x / WBC x   Sq Epi: x / Non Sq Epi: x / Bacteria: x    VENT SETTINGS   Mode: AC/ CMV (Assist Control/ Continuous Mandatory Ventilation)  RR (machine): 24  TV (machine): 450  FiO2: 60  PEEP: 12  ITime: 1  MAP: 19  PIP: 34    MEDICATIONS  (STANDING):  albuterol/ipratropium for Nebulization 3 milliLiter(s) Nebulizer every 6 hours  azithromycin  IVPB      azithromycin  IVPB 500 milliGRAM(s) IV Intermittent every 24 hours  chlorhexidine 0.12% Liquid 15 milliLiter(s) Oral Mucosa every 12 hours  chlorhexidine 2% Cloths 1 Application(s) Topical <User Schedule>  heparin   Injectable 5000 Unit(s) SubCutaneous every 12 hours  levoFLOXacin IVPB 500 milliGRAM(s) IV Intermittent every 24 hours  levoFLOXacin IVPB      norepinephrine Infusion 0.05 MICROgram(s)/kG/Min (7.87 mL/Hr) IV Continuous <Continuous>  phenylephrine    Infusion 0.3 MICROgram(s)/kG/Min (11.4 mL/Hr) IV Continuous <Continuous>      REVIEW OF SYSTEMS:  Unable to obtain due to mechnical ventilation, sedation, poor mental status       Physicial Exam:     Constitutional: NAD, well-groomed, well-developed  HEENT: PERRLA, EOMI, no drainage or redness  Neck: No bruits; no thyromegaly or nodules,  No JVD  Back: Normal spine flexure, No CVA tenderness, No deformity or limitation of movement  Respiratory: Breath Sounds equal & clear to percussion & auscultation, no accessory muscle use  Cardiovascular: Regular rate & rhythm, normal S1, S2; no murmurs, gallops or rubs; no S3, S4  Gastrointestinal: Soft, non-tender, non distended no hepatosplenomegaly, normal bowel sounds  Extremities: No peripheral edema, No cyanosis, clubbing   Vascular: Equal and normal pulses: 2+ peripheral pulses throughout  Neurological: GCS:    A&O x 3; no sensory, motor  deficits, normal reflexes  Psychiatric: Normal mood, normal affect  Musculoskeletal: No joint pain, swelling or deformity; no limitation of movement  Skin: No rashes          
INTERVAL HPI/OVERNIGHT EVENTS:   HPI:  54 year old M with PMHx asthma presented to ED s/p cardiac arrest at home. 911 called, patient found agonal breathing and then cardiac arrest, CPR started immediately by EMS. ROSC obtained in ED after 5 epis. ~ 25 minute downtime. ICUconsulted for further evaluation. At bedside, niece present states that for past few days patient has been sick with URI and febrile. States today grandfather stated patient was ok at home. She is unclear on all his medical history but states patient is developmental delayed (not a part of opwdd). Will admit to ICU s/p cardiac arrest likely hypoxic arrest.  (19 Jan 2025 18:23)      CENTRAL LINE: [ ] YES [ ] NO  LOCATION:   DATE INSERTED:  REMOVE: [ ] YES [ ] NO  EXPLAIN:    FLORES: [ ] YES [ ] NO    DATE INSERTED:  REMOVE:  [ ] YES [ ] NO  EXPLAIN:    A-LINE:  [ ] YES [ ] NO  LOCATION:   DATE INSERTED:  REMOVE:  [ ] YES [ ] NO  EXPLAIN:    PAST MEDICAL & SURGICAL HISTORY:  Prediabetes      Asthma      No significant past surgical history          REVIEW OF SYSTEMS:    Negative ROS aside from HPI/Interval events above.    ICU Vital Signs Last 24 Hrs  T(C): 37.9 (22 Jan 2025 08:00), Max: 38.9 (21 Jan 2025 12:20)  T(F): 100.3 (22 Jan 2025 08:00), Max: 102 (21 Jan 2025 12:20)  HR: 79 (22 Jan 2025 10:00) (79 - 127)  BP: 122/45 (22 Jan 2025 04:30) (70/48 - 122/45)  BP(mean): 65 (22 Jan 2025 04:30) (54 - 76)  ABP: 93/58 (22 Jan 2025 10:00) (67/50 - 168/94)  ABP(mean): 70 (22 Jan 2025 10:00) (57 - 123)  RR: 28 (22 Jan 2025 10:00) (24 - 28)  SpO2: 94% (22 Jan 2025 10:00) (82% - 100%)    O2 Parameters below as of 22 Jan 2025 05:01  Patient On (Oxygen Delivery Method): ventilator            ABG - ( 22 Jan 2025 07:46 )  pH, Arterial: 7.26  pH, Blood: x     /  pCO2: 40    /  pO2: 93    / HCO3: 18    / Base Excess: -8.7  /  SaO2: 97.8                I&O's Detail    21 Jan 2025 07:01  -  22 Jan 2025 07:00  --------------------------------------------------------  IN:    dextrose 10%: 1050 mL    IV PiggyBack: 1610 mL    Modular Fluid: 75 mL    Phenylephrine: 1235.8 mL    Phenylephrine: 551.5 mL    Sodium Bicarbonate: 50 mL    Sodium Bicarbonate: 1200 mL    Sodium Chloride 0.9% Bolus: 1000 mL    Vasopressin: 72 mL  Total IN: 6844.3 mL    OUT:    Indwelling Catheter - Urethral (mL): 280 mL  Total OUT: 280 mL    Total NET: 6564.3 mL      22 Jan 2025 07:01  -  22 Jan 2025 10:54  --------------------------------------------------------  IN:    Sodium Bicarbonate: 600 mL    Vasopressin: 24 mL  Total IN: 624 mL    OUT:  Total OUT: 0 mL    Total NET: 624 mL      Mode: AC/ CMV (Assist Control/ Continuous Mandatory Ventilation)  RR (machine): 28  TV (machine): 450  FiO2: 70  PEEP: 12  ITime: 1  MAP: 20  PC:   PIP: 36    CAPILLARY BLOOD GLUCOSE      POCT Blood Glucose.: 182 mg/dL (22 Jan 2025 08:23)  POCT Blood Glucose.: 246 mg/dL (22 Jan 2025 06:20)  POCT Blood Glucose.: 279 mg/dL (22 Jan 2025 05:26)  POCT Blood Glucose.: 378 mg/dL (22 Jan 2025 04:25)  POCT Blood Glucose.: 204 mg/dL (22 Jan 2025 04:10)  POCT Blood Glucose.: 291 mg/dL (22 Jan 2025 02:31)  POCT Blood Glucose.: 224 mg/dL (22 Jan 2025 00:51)  POCT Blood Glucose.: >600 mg/dL (21 Jan 2025 22:26)  POCT Blood Glucose.: 68 mg/dL (21 Jan 2025 20:44)  POCT Blood Glucose.: 68 mg/dL (21 Jan 2025 18:10)  POCT Blood Glucose.: 65 mg/dL (21 Jan 2025 16:12)  POCT Blood Glucose.: 111 mg/dL (21 Jan 2025 15:14)  POCT Blood Glucose.: 39 mg/dL (21 Jan 2025 14:44)  POCT Blood Glucose.: 41 mg/dL (21 Jan 2025 14:42)  POCT Blood Glucose.: 125 mg/dL (21 Jan 2025 13:09)  POCT Blood Glucose.: 31 mg/dL (21 Jan 2025 13:06)  POCT Blood Glucose.: 21 mg/dL (21 Jan 2025 12:49)  POCT Blood Glucose.: 23 mg/dL (21 Jan 2025 12:41)        PHYSICAL EXAM:    off sedation  Full vent support  RRR  cta b/l. coarse.  nt nd soft abd  minimal edema b/l. skin intact. warm.  pupils 4mm b/l fixed. no corneal. no gag. unresponsive.      LABS:                        13.1   19.66 )-----------( 112      ( 22 Jan 2025 03:16 )             42.8      01-22    133[L]  |  90[L]  |  88[H]  ----------------------------<  212[H]  7.9[HH]   |  19[L]  |  7.72[H]    Ca    5.2[LL]      22 Jan 2025 07:50  Phos  15.8     01-22  Mg     2.9     01-22    TPro  5.8[L]  /  Alb  1.7[L]  /  TBili  2.9[H]  /  DBili  x   /  AST  4593[H]  /  ALT  3674[H]  /  AlkPhos  179[H]  01-22      Urinalysis Basic - ( 22 Jan 2025 07:50 )    Color: x / Appearance: x / SG: x / pH: x  Gluc: 212 mg/dL / Ketone: x  / Bili: x / Urobili: x   Blood: x / Protein: x / Nitrite: x   Leuk Esterase: x / RBC: x / WBC x   Sq Epi: x / Non Sq Epi: x / Bacteria: x      Culture Results:   Commensal tavo consistent with body site (01-20 @ 12:40)  Culture Results:   No growth at 48 Hours (01-19 @ 17:56)  Culture Results:   No growth at 48 Hours (01-19 @ 17:40)
INTERVAL HPI/OVERNIGHT EVENTS:   HPI:  54 year old M with PMHx asthma presented to ED s/p cardiac arrest at home. 911 called, patient found agonal breathing and then cardiac arrest, CPR started immediately by EMS. ROSC obtained in ED after 5 epis. ~ 25 minute downtime. ICUconsulted for further evaluation. At bedside, niece present states that for past few days patient has been sick with URI and febrile. States today grandfather stated patient was ok at home. She is unclear on all his medical history but states patient is developmental delayed (not a part of opwdd). Will admit to ICU s/p cardiac arrest likely hypoxic arrest.  (19 Jan 2025 18:23)      CENTRAL LINE: [ ] YES [ ] NO  LOCATION:   DATE INSERTED:  REMOVE: [ ] YES [ ] NO  EXPLAIN:    FLORES: [ ] YES [ ] NO    DATE INSERTED:  REMOVE:  [ ] YES [ ] NO  EXPLAIN:    A-LINE:  [ ] YES [ ] NO  LOCATION:   DATE INSERTED:  REMOVE:  [ ] YES [ ] NO  EXPLAIN:    PAST MEDICAL & SURGICAL HISTORY:  Prediabetes      Asthma      No significant past surgical history          REVIEW OF SYSTEMS:    Negative ROS aside from HPI/Interval events above.    ICU Vital Signs Last 24 Hrs  T(C): 39.1 (21 Jan 2025 06:39), Max: 39.4 (20 Jan 2025 14:00)  T(F): 102.3 (21 Jan 2025 06:39), Max: 103 (20 Jan 2025 14:00)  HR: 121 (21 Jan 2025 13:00) (98 - 136)  BP: 89/61 (21 Jan 2025 13:00) (77/47 - 196/172)  BP(mean): 71 (21 Jan 2025 13:00) (53 - 180)  ABP: 158/88 (21 Jan 2025 13:00) (70/53 - 168/95)  ABP(mean): 109 (21 Jan 2025 13:00) (57 - 120)  RR: 24 (21 Jan 2025 13:00) (19 - 32)  SpO2: 99% (21 Jan 2025 13:00) (85% - 100%)    O2 Parameters below as of 21 Jan 2025 12:50  Patient On (Oxygen Delivery Method): ventilator            ABG - ( 20 Jan 2025 22:24 )  pH, Arterial: 7.37  pH, Blood: x     /  pCO2: 50    /  pO2: 79    / HCO3: 29    / Base Excess: 2.4   /  SaO2: 95.6                I&O's Detail    20 Jan 2025 07:01  -  21 Jan 2025 07:00  --------------------------------------------------------  IN:    IV PiggyBack: 350 mL    IV PiggyBack: 500 mL    Phenylephrine: 302 mL    Propofol: 31.2 mL  Total IN: 1183.2 mL    OUT:    Indwelling Catheter - Urethral (mL): 50 mL  Total OUT: 50 mL    Total NET: 1133.2 mL      21 Jan 2025 07:01  -  21 Jan 2025 13:32  --------------------------------------------------------  IN:    Phenylephrine: 85 mL  Total IN: 85 mL    OUT:    Indwelling Catheter - Urethral (mL): 100 mL  Total OUT: 100 mL    Total NET: -15 mL          Mode: AC/ CMV (Assist Control/ Continuous Mandatory Ventilation)  RR (machine): 24  TV (machine): 450  FiO2: 80  PEEP: 12  ITime: 1  MAP: 19  PC: 12  PIP: 37    CAPILLARY BLOOD GLUCOSE      POCT Blood Glucose.: 125 mg/dL (21 Jan 2025 13:09)  POCT Blood Glucose.: 31 mg/dL (21 Jan 2025 13:06)  POCT Blood Glucose.: 21 mg/dL (21 Jan 2025 12:49)  POCT Blood Glucose.: 23 mg/dL (21 Jan 2025 12:41)  POCT Blood Glucose.: 125 mg/dL (21 Jan 2025 07:14)  POCT Blood Glucose.: 55 mg/dL (21 Jan 2025 06:53)  POCT Blood Glucose.: 111 mg/dL (20 Jan 2025 17:40)        PHYSICAL EXAM:    off sedation  Full vent support  RRR  cta b/l. coarse.  nt nd soft abd  minimal edema b/l. skin intact. warm.  pupils 4mm b/l fixed. no corneal. no gag. unresponsive.      LABS:                        14.0   19.13 )-----------( 126      ( 21 Jan 2025 03:15 )             43.9      01-21    135  |  95[L]  |  69[H]  ----------------------------<  106[H]  4.5   |  27  |  5.53[H]    Ca    7.6[L]      21 Jan 2025 03:15  Phos  8.4     01-21  Mg     2.2     01-21    TPro  7.2  /  Alb  2.2[L]  /  TBili  1.7[H]  /  DBili  x   /  AST  3203[H]  /  ALT  4199[H]  /  AlkPhos  136[H]  01-21    PT/INR - ( 19 Jan 2025 17:40 )   PT: 17.1 sec;   INR: 1.48 ratio         PTT - ( 19 Jan 2025 17:40 )  PTT:32.2 sec  Urinalysis Basic - ( 21 Jan 2025 03:15 )    Color: x / Appearance: x / SG: x / pH: x  Gluc: 106 mg/dL / Ketone: x  / Bili: x / Urobili: x   Blood: x / Protein: x / Nitrite: x   Leuk Esterase: x / RBC: x / WBC x   Sq Epi: x / Non Sq Epi: x / Bacteria: x      Culture Results:   Commensal tavo consistent with body site (01-20 @ 12:40)  Culture Results:   No growth at 24 hours (01-19 @ 17:56)  Culture Results:   No growth at 24 hours (01-19 @ 17:40)
INTERVAL HPI/OVERNIGHT EVENTS:   HPI:  54 year old M with PMHx asthma presented to ED s/p cardiac arrest at home. 911 called, patient found agonal breathing and then cardiac arrest, CPR started immediately by EMS. ROSC obtained in ED after 5 epis. ~ 25 minute downtime. ICUconsulted for further evaluation. At bedside, niece present states that for past few days patient has been sick with URI and febrile. States today grandfather stated patient was ok at home. She is unclear on all his medical history but states patient is developmental delayed (not a part of opwdd). Will admit to ICU s/p cardiac arrest likely hypoxic arrest.  (19 Jan 2025 18:23)      CENTRAL LINE: [ ] YES [ ] NO  LOCATION:   DATE INSERTED:  REMOVE: [ ] YES [ ] NO  EXPLAIN:    FLORES: [ ] YES [ ] NO    DATE INSERTED:  REMOVE:  [ ] YES [ ] NO  EXPLAIN:    A-LINE:  [ ] YES [ ] NO  LOCATION:   DATE INSERTED:  REMOVE:  [ ] YES [ ] NO  EXPLAIN:    PAST MEDICAL & SURGICAL HISTORY:  Prediabetes      Asthma      No significant past surgical history          REVIEW OF SYSTEMS:    Negative ROS aside from HPI/Interval events above.    ICU Vital Signs Last 24 Hrs  T(C): 40.6 (20 Jan 2025 12:00), Max: 40.6 (20 Jan 2025 05:30)  T(F): 105 (20 Jan 2025 12:00), Max: 105.1 (20 Jan 2025 05:30)  HR: 109 (20 Jan 2025 12:00) (80 - 137)  BP: 126/74 (20 Jan 2025 12:00) (62/43 - 213/125)  BP(mean): 89 (20 Jan 2025 12:00) (51 - 160)  ABP: --  ABP(mean): --  RR: 25 (20 Jan 2025 12:00) (6 - 28)  SpO2: 93% (20 Jan 2025 12:00) (73% - 100%)    O2 Parameters below as of 20 Jan 2025 12:00  Patient On (Oxygen Delivery Method): ventilator    O2 Concentration (%): 60        ABG - ( 19 Jan 2025 22:27 )  pH, Arterial: 7.42  pH, Blood: x     /  pCO2: 46    /  pO2: 76    / HCO3: 30    / Base Excess: 4.5   /  SaO2: 95.0                I&O's Detail    19 Jan 2025 07:01  -  20 Jan 2025 07:00  --------------------------------------------------------  IN:    Norepinephrine: 214.8 mL    Propofol: 158.6 mL  Total IN: 373.4 mL    OUT:    Indwelling Catheter - Urethral (mL): 80 mL  Total OUT: 80 mL    Total NET: 293.4 mL      20 Jan 2025 07:01  -  20 Jan 2025 12:38  --------------------------------------------------------  IN:    Propofol: 31.2 mL  Total IN: 31.2 mL    OUT:    Indwelling Catheter - Urethral (mL): 0 mL  Total OUT: 0 mL    Total NET: 31.2 mL          Mode: AC/ CMV (Assist Control/ Continuous Mandatory Ventilation)  RR (machine): 28  TV (machine): 550  FiO2: 60  PEEP: 12  PS: 12  ITime: 1  MAP: 20  PC: 12  PIP: 36    CAPILLARY BLOOD GLUCOSE      POCT Blood Glucose.: 119 mg/dL (20 Jan 2025 11:57)  POCT Blood Glucose.: 134 mg/dL (20 Jan 2025 06:14)  POCT Blood Glucose.: 170 mg/dL (19 Jan 2025 17:24)        PHYSICAL EXAM:    Sedated mildly.  Full vent support  RRR  cta b/l. coarse.  nt nd soft abd  minimal edema b/l. skin intact. warm.  pupils 3mm b/l fixed. no corneal. no gag. unresponsive.      LABS:                        13.9   11.48 )-----------( 118      ( 20 Jan 2025 03:41 )             43.7      01-20    137  |  98  |  42[H]  ----------------------------<  168[H]  4.8   |  27  |  2.61[H]    Ca    8.0[L]      20 Jan 2025 03:41  Phos  1.0     01-20  Mg     1.8     01-20    TPro  6.8  /  Alb  2.3[L]  /  TBili  1.2  /  DBili  x   /  AST  8056[H]  /  ALT  4348[H]  /  AlkPhos  121[H]  01-20    PT/INR - ( 19 Jan 2025 17:40 )   PT: 17.1 sec;   INR: 1.48 ratio         PTT - ( 19 Jan 2025 17:40 )  PTT:32.2 sec  Urinalysis Basic - ( 20 Jan 2025 03:41 )    Color: x / Appearance: x / SG: x / pH: x  Gluc: 168 mg/dL / Ketone: x  / Bili: x / Urobili: x   Blood: x / Protein: x / Nitrite: x   Leuk Esterase: x / RBC: x / WBC x   Sq Epi: x / Non Sq Epi: x / Bacteria: x  
Patient seen and examined this am. No new events    MEDICATIONS:    albuterol/ipratropium for Nebulization 3 milliLiter(s) Nebulizer every 6 hours  chlorhexidine 0.12% Liquid 15 milliLiter(s) Oral Mucosa every 12 hours  chlorhexidine 2% Cloths 1 Application(s) Topical <User Schedule>  dextrose 50% Injectable 50 milliLiter(s) IV Push once  heparin   Injectable 5000 Unit(s) SubCutaneous every 12 hours  levETIRAcetam  IVPB 500 milliGRAM(s) IV Intermittent daily  levoFLOXacin IVPB 500 milliGRAM(s) IV Intermittent every 48 hours  midazolam Injectable 4 milliGRAM(s) IV Push every 2 hours PRN  midodrine 20 milliGRAM(s) Oral every 8 hours  phenylephrine    Infusion 1 MICROgram(s)/kG/Min IV Continuous <Continuous>  sodium bicarbonate  Infusion 0.222 mEq/kG/Hr IV Continuous <Continuous>  sodium chloride 3%  Inhalation 4 milliLiter(s) Inhalation every 6 hours  sodium zirconium cyclosilicate 10 Gram(s) Oral every 8 hours  vasopressin Infusion 0.04 Unit(s)/Min IV Continuous <Continuous>      LABS:                          13.1   19.66 )-----------( 112      ( 22 Jan 2025 03:16 )             42.8     01-22    133[L]  |  90[L]  |  88[H]  ----------------------------<  212[H]  7.9[HH]   |  19[L]  |  7.72[H]    Ca    5.2[LL]      22 Jan 2025 07:50  Phos  15.8     01-22  Mg     2.9     01-22    TPro  5.8[L]  /  Alb  1.7[L]  /  TBili  2.9[H]  /  DBili  x   /  AST  4593[H]  /  ALT  3674[H]  /  AlkPhos  179[H]  01-22    CAPILLARY BLOOD GLUCOSE      POCT Blood Glucose.: 182 mg/dL (22 Jan 2025 08:23)  POCT Blood Glucose.: 246 mg/dL (22 Jan 2025 06:20)  POCT Blood Glucose.: 279 mg/dL (22 Jan 2025 05:26)  POCT Blood Glucose.: 378 mg/dL (22 Jan 2025 04:25)  POCT Blood Glucose.: 204 mg/dL (22 Jan 2025 04:10)  POCT Blood Glucose.: 291 mg/dL (22 Jan 2025 02:31)  POCT Blood Glucose.: 224 mg/dL (22 Jan 2025 00:51)  POCT Blood Glucose.: >600 mg/dL (21 Jan 2025 22:26)  POCT Blood Glucose.: 68 mg/dL (21 Jan 2025 20:44)  POCT Blood Glucose.: 68 mg/dL (21 Jan 2025 18:10)  POCT Blood Glucose.: 65 mg/dL (21 Jan 2025 16:12)  POCT Blood Glucose.: 111 mg/dL (21 Jan 2025 15:14)  POCT Blood Glucose.: 39 mg/dL (21 Jan 2025 14:44)  POCT Blood Glucose.: 41 mg/dL (21 Jan 2025 14:42)  POCT Blood Glucose.: 125 mg/dL (21 Jan 2025 13:09)  POCT Blood Glucose.: 31 mg/dL (21 Jan 2025 13:06)  POCT Blood Glucose.: 21 mg/dL (21 Jan 2025 12:49)  POCT Blood Glucose.: 23 mg/dL (21 Jan 2025 12:41)      Urinalysis Basic - ( 22 Jan 2025 07:50 )    Color: x / Appearance: x / SG: x / pH: x  Gluc: 212 mg/dL / Ketone: x  / Bili: x / Urobili: x   Blood: x / Protein: x / Nitrite: x   Leuk Esterase: x / RBC: x / WBC x   Sq Epi: x / Non Sq Epi: x / Bacteria: x      I&O's Summary    21 Jan 2025 07:01  -  22 Jan 2025 07:00  --------------------------------------------------------  IN: 6844.3 mL / OUT: 280 mL / NET: 6564.3 mL      Vital Signs Last 24 Hrs  T(C): 37.9 (22 Jan 2025 05:00), Max: 38.9 (21 Jan 2025 12:20)  T(F): 100.3 (22 Jan 2025 05:00), Max: 102 (21 Jan 2025 12:20)  HR: 115 (22 Jan 2025 07:30) (80 - 136)  BP: 122/45 (22 Jan 2025 04:30) (70/48 - 122/45)  BP(mean): 65 (22 Jan 2025 04:30) (54 - 76)  RR: 28 (22 Jan 2025 07:30) (24 - 28)  SpO2: 99% (22 Jan 2025 07:30) (82% - 100%)    Parameters below as of 22 Jan 2025 05:01  Patient On (Oxygen Delivery Method): ventilator          On Neurological Examination:    Mental Status - eyes closed no response to noxious stimuli no purposeful moevemnt    Cranial Nerves - eyes 5 mm fixed, no conreals, no gag, eyes midline with dolls eyes    Motor Exam -   no purposeful movements    Coord: deferred  Reflexes:          absent        RADIOLOGY  < from: CT Head No Cont (01.19.25 @ 21:29) >  IMPRESSION: No evidence of an acute transcortical infarction or   hemorrhage.    --- End of Report ---    < end of copied text >

## 2025-01-22 NOTE — DIETITIAN INITIAL EVALUATION ADULT - PERTINENT LABORATORY DATA
01-22    131[L]  |  87[L]  |  94[H]  ----------------------------<  182[H]  8.2[HH]   |  17[L]  |  7.95[H]    Ca    <3.0[!!]      22 Jan 2025 12:45  Phos  16.9     01-22  Mg     3.1     01-22  Lactate 15.1 mmol/L<H>    TPro  5.4[L]  /  Alb  1.6[L]  /  TBili  2.9[H]  /  DBili  x   /  AST  6689[H]  /  ALT  3753[H]  /  AlkPhos  198[H]  01-22  POCT Blood Glucose.: 152 mg/dL (01-22-25 @ 15:04)

## 2025-01-22 NOTE — DIETITIAN INITIAL EVALUATION ADULT - PERTINENT MEDS FT
MEDICATIONS  (STANDING):  albuterol/ipratropium for Nebulization 3 milliLiter(s) Nebulizer every 6 hours  calcium gluconate IVPB 2 Gram(s) IV Intermittent every 1 hour  chlorhexidine 0.12% Liquid 15 milliLiter(s) Oral Mucosa every 12 hours  chlorhexidine 2% Cloths 1 Application(s) Topical <User Schedule>  dextrose 50% Injectable 50 milliLiter(s) IV Push once  heparin   Injectable 5000 Unit(s) SubCutaneous every 12 hours  insulin regular  human recombinant 5 Unit(s) IV Push once  levETIRAcetam  IVPB 500 milliGRAM(s) IV Intermittent daily  levoFLOXacin IVPB 500 milliGRAM(s) IV Intermittent every 48 hours  midodrine 20 milliGRAM(s) Oral every 8 hours  phenylephrine    Infusion 1 MICROgram(s)/kG/Min (19 mL/Hr) IV Continuous <Continuous>  sodium bicarbonate  Infusion 0.222 mEq/kG/Hr (150 mL/Hr) IV Continuous <Continuous>  sodium chloride 3%  Inhalation 4 milliLiter(s) Inhalation every 6 hours  sodium zirconium cyclosilicate 10 Gram(s) Oral every 8 hours  vasopressin Infusion 0.04 Unit(s)/Min (6 mL/Hr) IV Continuous <Continuous>    MEDICATIONS  (PRN):  midazolam Injectable 4 milliGRAM(s) IV Push every 2 hours PRN Myoclonus

## 2025-01-22 NOTE — PROGRESS NOTE ADULT - ASSESSMENT
54 year old M with PMHx asthma presented to ED s/p cardiac arrest at home. ROSC after ~ 25 minute downtime.  Neurology called for myoclonus  On exam no purposeful movement, pupils fixed, no corneals, no gag  initial CTh read as wnl     suspect severe anoxic injury with myoclonus,   acute renal failure, shock liver    GOC  routine EEG  when stable can repeat CTH to assess for anoxic injury  If myoclonus persists can start Keppra 500 mg daily- renally dosed  prognosis poor given devastating exam, off sedation with myoclonus

## 2025-01-22 NOTE — PATIENT PROFILE ADULT - FALL HARM RISK - HARM RISK INTERVENTIONS

## 2025-01-22 NOTE — DIETITIAN INITIAL EVALUATION ADULT - [id number]_GEN_A_CORE
No active nutrition diagnosis identified at this time/Current medical/surgical condition precludes nutrition intervention at this time

## 2025-01-22 NOTE — PROVIDER CONTACT NOTE (CRITICAL VALUE NOTIFICATION) - TEST AND RESULT REPORTED:
Lactate 15.1
Potassium 7.5, Calcium 5.8
k+ 7.9,  ca2+ 5.2
Potassium 6.6 Calcium 6.3
Potassium 8.2 and Calcium less than 3

## 2025-01-22 NOTE — DISCHARGE NOTE FOR THE EXPIRED PATIENT - HOSPITAL COURSE
54 year old M with PMHx asthma presented to ED s/p cardiac arrest at home. 911 called, patient found agonal breathing and then cardiac arrest, CPR started immediately by EMS. ROSC obtained in ED after 5 epis. ~ 25 minute downtime. Course complicated of anoxia / anoxic brain injury and worsening multi-system organ failure. patient was hyperkalemic; acidotic and actively dying. Vasopressors capped by attending. Metabolic panel continues to worsen despite maximal efforts. Per attending note- no indication for Further blood work as there is no further treatment/management as it has been exhausted. Patient will not benefit from repeat resuscitative efforts; patient was updated and support provided..  Called to bedside by RN to pronounce patient. On physical exam, patient did not respond to verbal or noxious stimuli.  No spontaneous respirations.  Absent heart and breath sounds.  Absent radial and carotid pulses.   Pupils are fixed and dilated, no corneal reflex.  EKG rhythm strip shows asystole. Patient pronounced dead at 1817.  Attending notified.  Family called and coming to bedside; support provided.    54 year old M with PMHx asthma presented to ED s/p cardiac arrest at home. 911 called, patient found agonal breathing and then cardiac arrest, CPR started immediately by EMS. ROSC obtained in ED after 5 epis. ~ 25 minute downtime. Course complicated of anoxia / anoxic brain injury and worsening multi-system organ failure. Admitted with likely Septic Shock 2/2 suspected pneumonia. patient was hyperkalemic; acidotic and actively dying. Vasopressors capped by attending. Metabolic panel continues to worsen despite maximal efforts. Per attending note- no indication for Further blood work as there is no further treatment/management as it has been exhausted. Patient will not benefit from repeat resuscitative efforts; patient was updated and support provided..  Called to bedside by RN to pronounce patient. On physical exam, patient did not respond to verbal or noxious stimuli.  No spontaneous respirations.  Absent heart and breath sounds.  Absent radial and carotid pulses.   Pupils are fixed and dilated, no corneal reflex.  EKG rhythm strip shows asystole. Patient pronounced dead at 1817.  Attending notified.  Family called and coming to bedside; support provided.

## 2025-01-22 NOTE — PROGRESS NOTE ADULT - ASSESSMENT
54 year old M with PMHx asthma presented to ED s/p cardiac arrest at home. 911 called, patient found agonal breathing and then cardiac arrest, CPR started immediately by EMS. ROSC obtained in ED after 5 epis. ~ 25 minute downtime. Suspect some level of anoxia / anoxic brain injury.    awaiting EEG.  keep on full vent support.   now on phenylepherine. rising dosage. Capped now. c/w midodrine.  NGT. GI PPX. TF.  strict I/O's. minimal UO for now. likely ARF from cardiac arrest. likely not candidate for any RRT. Bicarb infusion started overnight.  abx for pna.  dvt ppx  poor prognosis. Actively dying. 54 year old M with PMHx asthma presented to ED s/p cardiac arrest at home. 911 called, patient found agonal breathing and then cardiac arrest, CPR started immediately by EMS. ROSC obtained in ED after 5 epis. ~ 25 minute downtime. Suspect some level of anoxia / anoxic brain injury.    awaiting EEG.  keep on full vent support.   now on phenylepherine. rising dosage. Capped now. c/w midodrine.  NGT. GI PPX. TF.  strict I/O's. minimal UO for now. likely ARF from cardiac arrest. likely not candidate for any RRT. Bicarb infusion started overnight.  abx for pna.  dvt ppx  poor prognosis. Actively dying. Vasopressors capped. Metabolic panel continues to worsen despite maximal efforts. No indication for Further blood work as there is no further treatment/management as it has been exhausted. Patient will not benefit from repeat resuscitative efforts. Family was informed overnight by ICU Provider that patient is actively dying.

## 2025-01-22 NOTE — DIETITIAN INITIAL EVALUATION ADULT - FUNCTIONAL SCREEN CURRENT LEVEL: SWALLOWING (IF SCORE 2 OR MORE FOR ANY ITEM, CONSULT REHAB SERVICES), MLM)
well developed, well nourished , in no acute distress , ambulating without difficulty , normal communication ability 2 = difficulty swallowing liquids/foods

## 2025-01-25 LAB
CULTURE RESULTS: SIGNIFICANT CHANGE UP
CULTURE RESULTS: SIGNIFICANT CHANGE UP
SPECIMEN SOURCE: SIGNIFICANT CHANGE UP
SPECIMEN SOURCE: SIGNIFICANT CHANGE UP

## 2025-02-06 DIAGNOSIS — J18.9 PNEUMONIA, UNSPECIFIED ORGANISM: ICD-10-CM

## 2025-02-06 DIAGNOSIS — E66.9 OBESITY, UNSPECIFIED: ICD-10-CM

## 2025-02-06 DIAGNOSIS — I46.9 CARDIAC ARREST, CAUSE UNSPECIFIED: ICD-10-CM

## 2025-02-06 DIAGNOSIS — Z86.16 PERSONAL HISTORY OF COVID-19: ICD-10-CM

## 2025-02-06 DIAGNOSIS — A41.9 SEPSIS, UNSPECIFIED ORGANISM: ICD-10-CM

## 2025-02-06 DIAGNOSIS — G93.1 ANOXIC BRAIN DAMAGE, NOT ELSEWHERE CLASSIFIED: ICD-10-CM

## 2025-02-06 DIAGNOSIS — Z88.0 ALLERGY STATUS TO PENICILLIN: ICD-10-CM

## 2025-02-06 DIAGNOSIS — K72.00 ACUTE AND SUBACUTE HEPATIC FAILURE WITHOUT COMA: ICD-10-CM

## 2025-02-06 DIAGNOSIS — N17.9 ACUTE KIDNEY FAILURE, UNSPECIFIED: ICD-10-CM

## 2025-02-06 DIAGNOSIS — J96.01 ACUTE RESPIRATORY FAILURE WITH HYPOXIA: ICD-10-CM

## 2025-02-06 DIAGNOSIS — J45.909 UNSPECIFIED ASTHMA, UNCOMPLICATED: ICD-10-CM

## 2025-02-06 DIAGNOSIS — E83.42 HYPOMAGNESEMIA: ICD-10-CM

## 2025-02-06 DIAGNOSIS — E87.20 ACIDOSIS, UNSPECIFIED: ICD-10-CM

## 2025-02-06 DIAGNOSIS — E87.5 HYPERKALEMIA: ICD-10-CM

## 2025-02-06 DIAGNOSIS — R65.21 SEVERE SEPSIS WITH SEPTIC SHOCK: ICD-10-CM
